# Patient Record
Sex: FEMALE | Race: BLACK OR AFRICAN AMERICAN | NOT HISPANIC OR LATINO | Employment: FULL TIME | URBAN - METROPOLITAN AREA
[De-identification: names, ages, dates, MRNs, and addresses within clinical notes are randomized per-mention and may not be internally consistent; named-entity substitution may affect disease eponyms.]

---

## 2023-06-06 ENCOUNTER — APPOINTMENT (OUTPATIENT)
Dept: PRIMARY CARE | Facility: CLINIC | Age: 28
End: 2023-06-06
Payer: MEDICAID

## 2023-10-03 ENCOUNTER — HOSPITAL ENCOUNTER (OUTPATIENT)
Facility: HOSPITAL | Age: 28
Setting detail: OBSERVATION
LOS: 1 days | Discharge: HOME | End: 2023-10-05
Attending: EMERGENCY MEDICINE | Admitting: INTERNAL MEDICINE
Payer: MEDICAID

## 2023-10-03 ENCOUNTER — APPOINTMENT (OUTPATIENT)
Dept: RADIOLOGY | Facility: HOSPITAL | Age: 28
End: 2023-10-03
Payer: MEDICAID

## 2023-10-03 DIAGNOSIS — N61.1 LEFT BREAST ABSCESS: ICD-10-CM

## 2023-10-03 DIAGNOSIS — L02.214 ABSCESS OF GROIN, RIGHT: Primary | ICD-10-CM

## 2023-10-03 LAB
ALBUMIN SERPL BCP-MCNC: 4.4 G/DL (ref 3.4–5)
ALP SERPL-CCNC: 63 U/L (ref 33–110)
ALT SERPL W P-5'-P-CCNC: 22 U/L (ref 7–45)
ANION GAP SERPL CALC-SCNC: 12 MMOL/L (ref 10–20)
AST SERPL W P-5'-P-CCNC: 12 U/L (ref 9–39)
B-HCG SERPL-ACNC: <2 MIU/ML
BASOPHILS # BLD AUTO: 0.01 X10*3/UL (ref 0–0.1)
BASOPHILS NFR BLD AUTO: 0.1 %
BILIRUB SERPL-MCNC: 1.2 MG/DL (ref 0–1.2)
BUN SERPL-MCNC: 9 MG/DL (ref 6–23)
CALCIUM SERPL-MCNC: 9.9 MG/DL (ref 8.6–10.3)
CHLORIDE SERPL-SCNC: 105 MMOL/L (ref 98–107)
CO2 SERPL-SCNC: 26 MMOL/L (ref 21–32)
CREAT SERPL-MCNC: 0.69 MG/DL (ref 0.5–1.05)
EOSINOPHIL # BLD AUTO: 0 X10*3/UL (ref 0–0.7)
EOSINOPHIL NFR BLD AUTO: 0 %
ERYTHROCYTE [DISTWIDTH] IN BLOOD BY AUTOMATED COUNT: 13.2 % (ref 11.5–14.5)
GFR SERPL CREATININE-BSD FRML MDRD: >90 ML/MIN/1.73M*2
GLUCOSE SERPL-MCNC: 101 MG/DL (ref 74–99)
HCT VFR BLD AUTO: 39.7 % (ref 36–46)
HGB BLD-MCNC: 13.2 G/DL (ref 12–16)
IMM GRANULOCYTES # BLD AUTO: 0.04 X10*3/UL (ref 0–0.7)
IMM GRANULOCYTES NFR BLD AUTO: 0.4 % (ref 0–0.9)
LYMPHOCYTES # BLD AUTO: 1.14 X10*3/UL (ref 1.2–4.8)
LYMPHOCYTES NFR BLD AUTO: 11.6 %
MCH RBC QN AUTO: 32.9 PG (ref 26–34)
MCHC RBC AUTO-ENTMCNC: 33.2 G/DL (ref 32–36)
MCV RBC AUTO: 99 FL (ref 80–100)
MONOCYTES # BLD AUTO: 0.61 X10*3/UL (ref 0.1–1)
MONOCYTES NFR BLD AUTO: 6.2 %
NEUTROPHILS # BLD AUTO: 8.02 X10*3/UL (ref 1.2–7.7)
NEUTROPHILS NFR BLD AUTO: 81.7 %
NRBC BLD-RTO: 0 /100 WBCS (ref 0–0)
PLATELET # BLD AUTO: 175 X10*3/UL (ref 150–450)
PMV BLD AUTO: 9.1 FL (ref 7.5–11.5)
POTASSIUM SERPL-SCNC: 4.1 MMOL/L (ref 3.5–5.3)
PROT SERPL-MCNC: 7.5 G/DL (ref 6.4–8.2)
RBC # BLD AUTO: 4.01 X10*6/UL (ref 4–5.2)
SODIUM SERPL-SCNC: 139 MMOL/L (ref 136–145)
WBC # BLD AUTO: 9.8 X10*3/UL (ref 4.4–11.3)

## 2023-10-03 PROCEDURE — 85025 COMPLETE CBC W/AUTO DIFF WBC: CPT | Performed by: PHYSICIAN ASSISTANT

## 2023-10-03 PROCEDURE — 74177 CT ABD & PELVIS W/CONTRAST: CPT | Performed by: RADIOLOGY

## 2023-10-03 PROCEDURE — 99222 1ST HOSP IP/OBS MODERATE 55: CPT | Performed by: INTERNAL MEDICINE

## 2023-10-03 PROCEDURE — 99203 OFFICE O/P NEW LOW 30 MIN: CPT

## 2023-10-03 PROCEDURE — 36415 COLL VENOUS BLD VENIPUNCTURE: CPT | Performed by: PHYSICIAN ASSISTANT

## 2023-10-03 PROCEDURE — 84075 ASSAY ALKALINE PHOSPHATASE: CPT | Performed by: PHYSICIAN ASSISTANT

## 2023-10-03 PROCEDURE — 1100000001 HC PRIVATE ROOM DAILY

## 2023-10-03 PROCEDURE — 74177 CT ABD & PELVIS W/CONTRAST: CPT

## 2023-10-03 PROCEDURE — 74176 CT ABD & PELVIS W/O CONTRAST: CPT | Performed by: RADIOLOGY

## 2023-10-03 PROCEDURE — 2500000004 HC RX 250 GENERAL PHARMACY W/ HCPCS (ALT 636 FOR OP/ED): Performed by: EMERGENCY MEDICINE

## 2023-10-03 PROCEDURE — 74176 CT ABD & PELVIS W/O CONTRAST: CPT | Mod: 59

## 2023-10-03 PROCEDURE — 2500000004 HC RX 250 GENERAL PHARMACY W/ HCPCS (ALT 636 FOR OP/ED): Performed by: PHYSICIAN ASSISTANT

## 2023-10-03 PROCEDURE — 84702 CHORIONIC GONADOTROPIN TEST: CPT | Performed by: PHYSICIAN ASSISTANT

## 2023-10-03 PROCEDURE — 99285 EMERGENCY DEPT VISIT HI MDM: CPT | Performed by: EMERGENCY MEDICINE

## 2023-10-03 PROCEDURE — 2500000004 HC RX 250 GENERAL PHARMACY W/ HCPCS (ALT 636 FOR OP/ED): Performed by: INTERNAL MEDICINE

## 2023-10-03 PROCEDURE — 2550000001 HC RX 255 CONTRASTS: Performed by: PHYSICIAN ASSISTANT

## 2023-10-03 RX ORDER — ENOXAPARIN SODIUM 100 MG/ML
40 INJECTION SUBCUTANEOUS DAILY
Status: DISCONTINUED | OUTPATIENT
Start: 2023-10-04 | End: 2023-10-05 | Stop reason: HOSPADM

## 2023-10-03 RX ORDER — VALACYCLOVIR HYDROCHLORIDE 500 MG/1
500 TABLET, FILM COATED ORAL 2 TIMES DAILY PRN
COMMUNITY

## 2023-10-03 RX ORDER — KETOROLAC TROMETHAMINE 30 MG/ML
15 INJECTION, SOLUTION INTRAMUSCULAR; INTRAVENOUS ONCE
Status: COMPLETED | OUTPATIENT
Start: 2023-10-03 | End: 2023-10-03

## 2023-10-03 RX ORDER — KETOROLAC TROMETHAMINE 30 MG/ML
30 INJECTION, SOLUTION INTRAMUSCULAR; INTRAVENOUS ONCE
Status: DISCONTINUED | OUTPATIENT
Start: 2023-10-03 | End: 2023-10-03

## 2023-10-03 RX ORDER — KETOROLAC TROMETHAMINE 15 MG/ML
15 INJECTION, SOLUTION INTRAMUSCULAR; INTRAVENOUS EVERY 6 HOURS PRN
Status: DISCONTINUED | OUTPATIENT
Start: 2023-10-03 | End: 2023-10-05 | Stop reason: HOSPADM

## 2023-10-03 RX ORDER — CLINDAMYCIN PHOSPHATE 600 MG/50ML
600 INJECTION, SOLUTION INTRAVENOUS ONCE
Status: DISCONTINUED | OUTPATIENT
Start: 2023-10-03 | End: 2023-10-03 | Stop reason: ALTCHOICE

## 2023-10-03 RX ORDER — KETOROLAC TROMETHAMINE 15 MG/ML
30 INJECTION, SOLUTION INTRAMUSCULAR; INTRAVENOUS EVERY 6 HOURS PRN
Status: DISCONTINUED | OUTPATIENT
Start: 2023-10-03 | End: 2023-10-05 | Stop reason: HOSPADM

## 2023-10-03 RX ADMIN — PIPERACILLIN SODIUM AND TAZOBACTAM SODIUM 3.38 G: 3; .375 INJECTION, SOLUTION INTRAVENOUS at 21:12

## 2023-10-03 RX ADMIN — CLINDAMYCIN PHOSPHATE 600 MG: 600 INJECTION, SOLUTION INTRAVENOUS at 20:28

## 2023-10-03 RX ADMIN — KETOROLAC TROMETHAMINE 15 MG: 30 INJECTION, SOLUTION INTRAMUSCULAR; INTRAVENOUS at 20:35

## 2023-10-03 RX ADMIN — IOHEXOL 75 ML: 350 INJECTION, SOLUTION INTRAVENOUS at 13:21

## 2023-10-03 SDOH — SOCIAL STABILITY: SOCIAL INSECURITY: DO YOU FEEL ANYONE HAS EXPLOITED OR TAKEN ADVANTAGE OF YOU FINANCIALLY OR OF YOUR PERSONAL PROPERTY?: NO

## 2023-10-03 SDOH — SOCIAL STABILITY: SOCIAL INSECURITY: HAVE YOU HAD THOUGHTS OF HARMING ANYONE ELSE?: NO

## 2023-10-03 SDOH — SOCIAL STABILITY: SOCIAL INSECURITY: HAS ANYONE EVER THREATENED TO HURT YOUR FAMILY OR YOUR PETS?: NO

## 2023-10-03 SDOH — SOCIAL STABILITY: SOCIAL INSECURITY: WERE YOU ABLE TO COMPLETE ALL THE BEHAVIORAL HEALTH SCREENINGS?: YES

## 2023-10-03 SDOH — SOCIAL STABILITY: SOCIAL INSECURITY: HAVE YOU HAD THOUGHTS OF HARMING ANYONE ELSE?: YES

## 2023-10-03 SDOH — SOCIAL STABILITY: SOCIAL INSECURITY: ARE THERE ANY APPARENT SIGNS OF INJURIES/BEHAVIORS THAT COULD BE RELATED TO ABUSE/NEGLECT?: NO

## 2023-10-03 SDOH — SOCIAL STABILITY: SOCIAL INSECURITY: DOES ANYONE TRY TO KEEP YOU FROM HAVING/CONTACTING OTHER FRIENDS OR DOING THINGS OUTSIDE YOUR HOME?: NO

## 2023-10-03 SDOH — SOCIAL STABILITY: SOCIAL INSECURITY: DO YOU FEEL UNSAFE GOING BACK TO THE PLACE WHERE YOU ARE LIVING?: NO

## 2023-10-03 SDOH — SOCIAL STABILITY: SOCIAL INSECURITY: ABUSE: ADULT

## 2023-10-03 SDOH — SOCIAL STABILITY: SOCIAL INSECURITY: ARE YOU OR HAVE YOU BEEN THREATENED OR ABUSED PHYSICALLY, EMOTIONALLY, OR SEXUALLY BY ANYONE?: NO

## 2023-10-03 ASSESSMENT — PAIN DESCRIPTION - ORIENTATION: ORIENTATION: RIGHT

## 2023-10-03 ASSESSMENT — PATIENT HEALTH QUESTIONNAIRE - PHQ9
2. FEELING DOWN, DEPRESSED OR HOPELESS: NOT AT ALL
SUM OF ALL RESPONSES TO PHQ9 QUESTIONS 1 & 2: 0
1. LITTLE INTEREST OR PLEASURE IN DOING THINGS: NOT AT ALL
SUM OF ALL RESPONSES TO PHQ9 QUESTIONS 1 & 2: 0
1. LITTLE INTEREST OR PLEASURE IN DOING THINGS: NOT AT ALL
2. FEELING DOWN, DEPRESSED OR HOPELESS: NOT AT ALL

## 2023-10-03 ASSESSMENT — COGNITIVE AND FUNCTIONAL STATUS - GENERAL
MOBILITY SCORE: 24
PATIENT BASELINE BEDBOUND: NO
PATIENT BASELINE BEDBOUND: NO
MOBILITY SCORE: 24
DAILY ACTIVITIY SCORE: 24
DAILY ACTIVITIY SCORE: 24

## 2023-10-03 ASSESSMENT — COLUMBIA-SUICIDE SEVERITY RATING SCALE - C-SSRS
6. HAVE YOU EVER DONE ANYTHING, STARTED TO DO ANYTHING, OR PREPARED TO DO ANYTHING TO END YOUR LIFE?: NO
6. HAVE YOU EVER DONE ANYTHING, STARTED TO DO ANYTHING, OR PREPARED TO DO ANYTHING TO END YOUR LIFE?: NO
1. IN THE PAST MONTH, HAVE YOU WISHED YOU WERE DEAD OR WISHED YOU COULD GO TO SLEEP AND NOT WAKE UP?: NO
2. HAVE YOU ACTUALLY HAD ANY THOUGHTS OF KILLING YOURSELF?: NO
1. IN THE PAST MONTH, HAVE YOU WISHED YOU WERE DEAD OR WISHED YOU COULD GO TO SLEEP AND NOT WAKE UP?: NO
2. HAVE YOU ACTUALLY HAD ANY THOUGHTS OF KILLING YOURSELF?: NO

## 2023-10-03 ASSESSMENT — ACTIVITIES OF DAILY LIVING (ADL)
GROOMING: INDEPENDENT
BATHING: INDEPENDENT
DRESSING YOURSELF: INDEPENDENT
TOILETING: INDEPENDENT
FEEDING YOURSELF: INDEPENDENT
WALKS IN HOME: INDEPENDENT
FEEDING YOURSELF: INDEPENDENT
HEARING - RIGHT EAR: FUNCTIONAL
TOILETING: INDEPENDENT
ADEQUATE_TO_COMPLETE_ADL: YES
PATIENT'S MEMORY ADEQUATE TO SAFELY COMPLETE DAILY ACTIVITIES?: YES
HEARING - LEFT EAR: FUNCTIONAL
BATHING: INDEPENDENT
GROOMING: INDEPENDENT
JUDGMENT_ADEQUATE_SAFELY_COMPLETE_DAILY_ACTIVITIES: YES
JUDGMENT_ADEQUATE_SAFELY_COMPLETE_DAILY_ACTIVITIES: YES
ADEQUATE_TO_COMPLETE_ADL: YES
WALKS IN HOME: INDEPENDENT
PATIENT'S MEMORY ADEQUATE TO SAFELY COMPLETE DAILY ACTIVITIES?: YES
DRESSING YOURSELF: INDEPENDENT

## 2023-10-03 ASSESSMENT — PAIN SCALES - GENERAL
PAINLEVEL_OUTOF10: 10 - WORST POSSIBLE PAIN
PAINLEVEL_OUTOF10: 10 - WORST POSSIBLE PAIN

## 2023-10-03 ASSESSMENT — ENCOUNTER SYMPTOMS
RESPIRATORY NEGATIVE: 1
WOUND: 1
EYES NEGATIVE: 1
CONSTITUTIONAL NEGATIVE: 1
GASTROINTESTINAL NEGATIVE: 1
ALLERGIC/IMMUNOLOGIC NEGATIVE: 1
CARDIOVASCULAR NEGATIVE: 1
PSYCHIATRIC NEGATIVE: 1
HEMATOLOGIC/LYMPHATIC NEGATIVE: 1
COLOR CHANGE: 1
ENDOCRINE NEGATIVE: 1
MUSCULOSKELETAL NEGATIVE: 1
NEUROLOGICAL NEGATIVE: 1

## 2023-10-03 ASSESSMENT — LIFESTYLE VARIABLES
HOW OFTEN DO YOU HAVE 6 OR MORE DRINKS ON ONE OCCASION: NEVER
AUDIT-C TOTAL SCORE: 1
HOW OFTEN DO YOU HAVE A DRINK CONTAINING ALCOHOL: MONTHLY OR LESS
HOW OFTEN DO YOU HAVE A DRINK CONTAINING ALCOHOL: MONTHLY OR LESS
SKIP TO QUESTIONS 9-10: 0
PRESCIPTION_ABUSE_PAST_12_MONTHS: NO
AUDIT-C TOTAL SCORE: 1
AUDIT-C TOTAL SCORE: 2
HOW OFTEN DO YOU HAVE 6 OR MORE DRINKS ON ONE OCCASION: LESS THAN MONTHLY
SUBSTANCE_ABUSE_PAST_12_MONTHS: YES
AUDIT-C TOTAL SCORE: 2
PRESCIPTION_ABUSE_PAST_12_MONTHS: NO
SKIP TO QUESTIONS 9-10: 1
HOW MANY STANDARD DRINKS CONTAINING ALCOHOL DO YOU HAVE ON A TYPICAL DAY: 1 OR 2
HOW MANY STANDARD DRINKS CONTAINING ALCOHOL DO YOU HAVE ON A TYPICAL DAY: 1 OR 2
SUBSTANCE_ABUSE_PAST_12_MONTHS: YES

## 2023-10-03 ASSESSMENT — PAIN - FUNCTIONAL ASSESSMENT: PAIN_FUNCTIONAL_ASSESSMENT: 0-10

## 2023-10-03 ASSESSMENT — PAIN DESCRIPTION - LOCATION: LOCATION: OTHER (COMMENT)

## 2023-10-03 NOTE — PROGRESS NOTES
Pharmacy Medication History Review    Leonardo Hardy is a 27 y.o. female admitted for Abscess of groin, right. Pharmacy reviewed the patient's [id number] medications and allergies for accuracy.    The list below reflectives the updated PTA list. Please review each medication in order reconciliation for additional clarification and justification.  (Not in a hospital admission)       The list below reflectives the updated allergy list. Please review each documented allergy for additional clarification and justification.  Allergies  Reviewed by Randi Reyes PA-C on 10/3/2023        Severity Reactions Comments    Erythromycin High Shortness of breath, Hives, Rash ? rash as infant Throat swells    Fish Derived Medium Hives     Sulfa (sulfonamide Antibiotics) Not Specified Hives             Below are additional concerns with the patient's PTA list.  Prior to Admission Medications   Prescriptions Last Dose Informant Patient Reported? Taking?   valACYclovir (Valtrex) 500 mg tablet   Yes No   Sig: Take 1 tablet (500 mg) by mouth 2 times a day as needed (for flares). Take for 5 days when flares show up      Facility-Administered Medications: None        Per patient . No Rx meds or otc other than the PRN listed     Siria Johnston CPhT

## 2023-10-03 NOTE — CONSULTS
SUSHMA VILLEGAS is a 27 year old Female with past medical history of carcinoid tumor s/p partial colectomy, hidradenitis suppurativa on chronic suppression with doxycycline now presenting to the ED with severe right groin pain and left breast discomfort.  In May patient had left breast abscess drained with penrose tube placed by Dr. Castillo, was admitted for IV antibiotics. Left breast without any recent flares until few weeks ago but has been manageable, discomfort minimal. Right groin has been getting progressively worse over the last week. 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 Having difficulty walking due to pain and swelling. Both areas are inflamed, red and indurated. Denies fever/chills.        Surgery consulted for left breast and right groin evaluation. Breast is warm, firm, indurated, inflammation mainly at 7 oclock, areola intact. Right groin is markedly inflamed with active drainage, multiple areas of discoloration and induration.     CT scan showed: In the area of clinical concern which was the proximal medial right thigh, there was an area medial skin thickening and edema of the subcutaneous fat with a small abscess in the medial subcutaneous fat measuring 23 x 20 mm, on axial image 178/200. There is mild thickening the fascia overlying the musculature of the medial aspect of the proximal to mid right thigh. Just cephalad and posterior to this abscess is a 2nd smaller abscess that measures 31 x 12  mm, on axial image 168/200. This particular abscess is at the posterior base of the right labia majora.    Lab work overall unremarkable.VSS, afebrile.     PMH: as above    PSH: appendectomy     Social: intermittent ETOH/MJ use, denies tobacco     Family: no relevant family history identified at this time     Vital signs for last 24 hours:  Temp:  [37 °C  (98.6 °F)] 37 °C (98.6 °F)  Heart Rate:  [103] 103  Resp:  [20] 20  BP: (145)/(87) 145/87      Physical Exam  Physical Exam  Constitutional:       Appearance: Normal appearance.   Cardiovascular:      Rate and Rhythm: Tachycardia present.   Pulmonary:      Effort: Pulmonary effort is normal. No respiratory distress.   Abdominal:      Palpations: Abdomen is soft.   Musculoskeletal:      Cervical back: Neck supple.      Comments: Difficulty with ambulation due to groin pain      Skin:     Comments:  Breast is warm, firm, indurated, inflammation mainly at 7 oclock, areola intact. Right groin is markedly inflamed with active drainage, multiple areas of discoloration and induration.    Neurological:      General: No focal deficit present.      Mental Status: She is alert and oriented to person, place, and time.   Psychiatric:         Behavior: Behavior normal.     Scheduled medications  clindamycin, 600 mg, intravenous, Once  ketorolac, 30 mg, intramuscular, Once      Results for orders placed or performed during the hospital encounter of 10/03/23 (from the past 24 hour(s))   CBC and Auto Differential   Result Value Ref Range    WBC 9.8 4.4 - 11.3 x10*3/uL    nRBC 0.0 0.0 - 0.0 /100 WBCs    RBC 4.01 4.00 - 5.20 x10*6/uL    Hemoglobin 13.2 12.0 - 16.0 g/dL    Hematocrit 39.7 36.0 - 46.0 %    MCV 99 80 - 100 fL    MCH 32.9 26.0 - 34.0 pg    MCHC 33.2 32.0 - 36.0 g/dL    RDW 13.2 11.5 - 14.5 %    Platelets 175 150 - 450 x10*3/uL    MPV 9.1 7.5 - 11.5 fL    Neutrophils % 81.7 40.0 - 80.0 %    Immature Granulocytes %, Automated 0.4 0.0 - 0.9 %    Lymphocytes % 11.6 13.0 - 44.0 %    Monocytes % 6.2 2.0 - 10.0 %    Eosinophils % 0.0 0.0 - 6.0 %    Basophils % 0.1 0.0 - 2.0 %    Neutrophils Absolute 8.02 (H) 1.20 - 7.70 x10*3/uL    Immature Granulocytes Absolute, Automated 0.04 0.00 - 0.70 x10*3/uL    Lymphocytes Absolute 1.14 (L) 1.20 - 4.80 x10*3/uL    Monocytes Absolute 0.61 0.10 - 1.00 x10*3/uL    Eosinophils Absolute 0.00  0.00 - 0.70 x10*3/uL    Basophils Absolute 0.01 0.00 - 0.10 x10*3/uL   Comprehensive metabolic panel   Result Value Ref Range    Glucose 101 (H) 74 - 99 mg/dL    Sodium 139 136 - 145 mmol/L    Potassium 4.1 3.5 - 5.3 mmol/L    Chloride 105 98 - 107 mmol/L    Bicarbonate 26 21 - 32 mmol/L    Anion Gap 12 10 - 20 mmol/L    Urea Nitrogen 9 6 - 23 mg/dL    Creatinine 0.69 0.50 - 1.05 mg/dL    eGFR >90 >60 mL/min/1.73m*2    Calcium 9.9 8.6 - 10.3 mg/dL    Albumin 4.4 3.4 - 5.0 g/dL    Alkaline Phosphatase 63 33 - 110 U/L    Total Protein 7.5 6.4 - 8.2 g/dL    AST 12 9 - 39 U/L    Bilirubin, Total 1.2 0.0 - 1.2 mg/dL    ALT 22 7 - 45 U/L   Human Chorionic Gonadotropin, Serum Quantitative   Result Value Ref Range    HCG, Beta-Quantitative <2 <5 mIU/mL      CT abdomen pelvis wo IV contrast    Result Date: 10/3/2023  Interpreted By:  Philipp Yanez, STUDY: CT ABDOMEN PELVIS WO IV CONTRAST;  10/3/2023 2:35 pm   INDICATION: Signs/Symptoms:abscess, first scan not low enough. Only need pelvis through midthigh.   COMPARISON: The exam was done immediately after a CT scan of the abdomen and pelvis with contrast earlier today at 1:30 p.m.. The images from that exam apparently did not include the area of interest which was the medial upper right..   ACCESSION NUMBER(S): [id number]   ORDERING CLINICIAN: DEAN PANDEY   TECHNIQUE: Repeat axial images were obtained from the xiphoid down through the mid thighs.   FINDINGS: In the area of clinical concern which was the proximal medial right thigh, there was an area medial skin thickening and edema of the subcutaneous fat with a small abscess in the medial subcutaneous fat measuring 23 x 20 mm, on axial image 178/200. There is mild thickening the fascia overlying the musculature of the medial aspect of the proximal to mid right thigh. Just cephalad and posterior to this abscess is a 2nd smaller abscess that measures 31 x 12  mm, on axial image 168/200. This particular abscess is at the  posterior base of the right labia majora.   For additional information, please see the report just dictated for the 1:30 p.m. exam.       There are 2 abscesses in the subcutaneous fat of the proximal to mid medial right thigh as described. Please see above for details.   MACRO: None   Signed by: Philipp Yanez 10/3/2023 2:49 PM Dictation workstation:   [id number]    CT abdomen pelvis w IV contrast    Result Date: 10/3/2023  Interpreted By:  Philipp Yanez, STUDY: CT ABDOMEN PELVIS W IV CONTRAST;  10/3/2023 1:33 pm   INDICATION: 28 y/o   F with  Signs/Symptoms:groin abscess.   LIMITATIONS: None.   ACCESSION NUMBER(S): [id number]   ORDERING CLINICIAN: DEAN PANDEY   TECHNIQUE: After the administration of   oral water and IV nonionic contrast, spiral axial images were obtained from the xiphoid down through the symphysis pubis. Sagittal and coronal reconstruction images were generated. Bone, mediastinal, lung, and liver windows were reviewed. IV contrast agent was Omnipaque 350, 75 mL.   COMPARISON: Previous exam is from 10/17/2022..   FINDINGS: LUNG BASES: No mass or pneumonia or pleural effusion in either lung base..   LIVER: Hepatomegaly, with the liver measuring 19.8 cm on the right Diffusely decreased liver density.. No  liver lesion evident in this  exam.   GALLBLADDER: No calcified stone, gallbladder wall thickening, or adjacent edema.   BILE DUCTS: No intrahepatic biliary ductal dilatation.  Common bile duct was within the limits of normal.   SPLEEN: No  splenomegaly. No splenic mass..   PANCREAS: No pancreatic mass or inflammation, or ductal dilatation.   KIDNEYS/ADRENALS: No adrenal mass or enlargement. No calcified stone, hydronephrosis, mass, or perinephric edema in either kidney. No ureteral stone or dilatation.   BLADDER/PELVIS: Urinary bladder was grossly intact. No uterine enlargement or gross adnexal mass. There is a tubal ligation clip in the posteroinferior right pelvis OA from the right adnexa, and  there is a displaced tubal ligation clip along the anterior surface of the mid abdominal peritoneum on axial image 106, just below the umbilicus.   GREAT VESSELS/RETROPERITONEUM: Abdominal aorta and IVC were intact. No suspicious retroperitoneal adenopathy. No suspicious mesenteric adenopathy. There is mild bilateral inguinal adenopathy, right greater than left. The largest lymph node on the right measures 15 mm AP today, compared to 11 mm previously. There is a right common femoral chain lymph node measuring 21 x 8 mm, previously measuring 15 x 7 mm. No other pelvic adenopathy..   PERITONEUM: No ascites. No pneumoperitoneum. No peritoneal or mesenteric mass or inflammation.   BOWEL: The stomach was  grossly intact. There was no small bowel dilatation or small bowel wall thickening. No small-bowel obstruction. Previous partial right colectomy with reanastomosis.   BONES: No destructive lytic or blastic bone lesion.   ABDOMINAL WALL: Unremarkable. There is no abscess in either imaged inguinal region. There was no skin thickening or stranding of the subcutaneous fat.       Previous partial right colectomy with reanastomosis.   Stable displaced tubal ligation clip from the right, remaining present in the right anterior midline pelvic mesenteric fat. Interval displacement of previous left tubal ligation clip.   Mild nonspecific bilateral inguinal adenopathy, left greater than right, mildly progressed on the right. This could be infectious/inflammatory adenopathy. There is also enlargement of a right common femoral chain lymph node. There is however no associated fluid collection or edema in the imaged portion of the right groin.   Mild hepatomegaly.   MACRO: None   Signed by: Philipp Yanez 10/3/2023 2:19 PM Dictation workstation:   [id number]        Plan:  - plan to admit, deciding medicine vs surgery  - continue antibiotics   - possible NPO at midnight for add on to OR      Dispo: Imaging to be reviewed by attending,  further plan of care pending.      Franny Torres, CNP

## 2023-10-03 NOTE — ED PROVIDER NOTES
HPI   Chief Complaint   Patient presents with    Abscess     Right thigh. Left breast.       27-year-old female with a history of hidradenitis suppurativa on daily doxycycline since June presents with concern for new groin abscess and left breast abscess.  Patient states that she was seen in June or July for surgery for the breast abscess and has been on doxycycline since.  Over the last week and a half, she has noticed a groin abscess but is not improving.  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  This seemed to make the area worse and is now more inflamed and expanding.  Dermatologist also placed an opening in the abscess with hopes that it would drain, but she did not change the antibiotics that the patient has been on.  Patient reports no fever or chills throughout this time but just extreme pain.  She is taken some Motrin with little relief.  No other complaints.  Denies urinary symptoms.                          No data recorded                Patient History   History reviewed. No pertinent past medical history.  Past Surgical History:   Procedure Laterality Date    CT NECK ANGIO W AND WO IV CONTRAST  12/26/2020    CT NECK ANGIO W AND WO IV CONTRAST 12/26/2020     No family history on file.  Social History     Tobacco Use    Smoking status: Not on file    Smokeless tobacco: Not on file   Substance Use Topics    Alcohol use: Not on file    Drug use: Not on file       Physical Exam   ED Triage Vitals [10/03/23 0725]   Temp Heart Rate Resp BP   37 °C (98.6 °F) 103 20 145/87      SpO2 Temp src Heart Rate Source Patient Position   100 % -- Monitor Sitting      BP Location FiO2 (%)     Left arm --       Physical Exam  Vitals and nursing note reviewed.   Constitutional:       General: She is not in acute distress.     Appearance: Normal appearance. She is not ill-appearing.   HENT:      Head: Normocephalic and atraumatic.      Right Ear: External ear normal.      Left  Ear: External ear normal.      Nose: Nose normal.      Mouth/Throat:      Mouth: Mucous membranes are moist.   Eyes:      Extraocular Movements: Extraocular movements intact.      Conjunctiva/sclera: Conjunctivae normal.      Pupils: Pupils are equal, round, and reactive to light.   Cardiovascular:      Rate and Rhythm: Normal rate and regular rhythm.      Pulses: Normal pulses.   Pulmonary:      Effort: Pulmonary effort is normal. No respiratory distress.      Breath sounds: Normal breath sounds.   Abdominal:      General: Abdomen is flat.      Palpations: Abdomen is soft.      Tenderness: There is no abdominal tenderness.   Genitourinary:     Comments: Significant abscess along the right most proximal thigh near where start of perineum is extending through the gluteus region.  No surrounding erythema other than local to the painful areas.  There is a small opening in the thigh portion that is not actively draining.  Exquisitely tender to palpation.  No crepitus or deformity palpated.  2+ DP pulse.  Musculoskeletal:         General: Normal range of motion.      Cervical back: Normal range of motion and neck supple.   Skin:     General: Skin is warm and dry.      Capillary Refill: Capillary refill takes less than 2 seconds.      Comments: Left breast: Medial aspect of the breast at approximately 7 to 8 PM there is a 2 cm x 2 cm area of fluctuance without warmth or significant pain to palpation.  Appears and feels superficial.  Old scar is present, this is much smaller.   Neurological:      General: No focal deficit present.      Mental Status: She is alert and oriented to person, place, and time.   Psychiatric:         Mood and Affect: Mood normal.       ED Course & MDM   ED Course as of 10/03/23 1901   Tue Oct 03, 2023   1900 Comprehensive metabolic panel(!)  noted [EP]   1900 CBC and Auto Differential(!)  noted [EP]   1900 CT abdomen pelvis wo IV contrast  reviewed [EP]      ED Course User Index  [EP] Randi NOYOLA  SHAMA Reyes         Diagnoses as of 10/03/23 1901   Abscess of groin, right   Left breast abscess       Medical Decision Making  27-year-old female with a history of at HS presents with concern for 2 abscesses.  The patient is nontoxic-appearing but her vital signs are notable for hypertension and tachycardia of 145/87 and 103.  The patient is afebrile.  Based on her presentation, differential diagnosis includes infected hidradenitis, NSTI, or local cellulitis.  Will obtain CBC with differential, CMP, hCG, and CT abdomen and pelvis with IV contrast.  Will defer I&D of breast abscess at this time pending results of CT. the patient was given 30 mg IM Toradol for pain control.  hCG was negative.  CMP grossly unremarkable.  CBC unremarkable other than increased absolute neutrophils.  CT showed 2 abscesses in the subcutaneous fat of the proximal to mid right thigh.  A second CT had to be completed prior to those results as it did not go low enough for viewing of the abscesses.  No further work-up indicated at this time.  Surgical team was consulted and recommended medical management versus surgical.  The patient was given 600 mg IV clindamycin for antibiotic coverage.  She was accepted for admission to Dr. Orellana.            Procedure  Procedures     Randi Reyes PA-C  10/03/23 1901

## 2023-10-04 ENCOUNTER — ANESTHESIA (OUTPATIENT)
Dept: OPERATING ROOM | Facility: HOSPITAL | Age: 28
End: 2023-10-04
Payer: MEDICAID

## 2023-10-04 ENCOUNTER — ANESTHESIA EVENT (OUTPATIENT)
Dept: OPERATING ROOM | Facility: HOSPITAL | Age: 28
End: 2023-10-04
Payer: MEDICAID

## 2023-10-04 PROBLEM — L73.2 HIDRADENITIS SUPPURATIVA: Status: ACTIVE | Noted: 2023-10-04

## 2023-10-04 PROBLEM — N61.1 LEFT BREAST ABSCESS: Status: ACTIVE | Noted: 2023-10-03

## 2023-10-04 LAB
ANION GAP SERPL CALC-SCNC: 10 MMOL/L (ref 10–20)
BUN SERPL-MCNC: 10 MG/DL (ref 6–23)
CALCIUM SERPL-MCNC: 9 MG/DL (ref 8.6–10.3)
CHLORIDE SERPL-SCNC: 104 MMOL/L (ref 98–107)
CO2 SERPL-SCNC: 27 MMOL/L (ref 21–32)
CREAT SERPL-MCNC: 0.81 MG/DL (ref 0.5–1.05)
ERYTHROCYTE [DISTWIDTH] IN BLOOD BY AUTOMATED COUNT: 13.2 % (ref 11.5–14.5)
GFR SERPL CREATININE-BSD FRML MDRD: >90 ML/MIN/1.73M*2
GLUCOSE SERPL-MCNC: 99 MG/DL (ref 74–99)
HCT VFR BLD AUTO: 35.9 % (ref 36–46)
HGB BLD-MCNC: 11.6 G/DL (ref 12–16)
MCH RBC QN AUTO: 32.3 PG (ref 26–34)
MCHC RBC AUTO-ENTMCNC: 32.3 G/DL (ref 32–36)
MCV RBC AUTO: 100 FL (ref 80–100)
NRBC BLD-RTO: 0 /100 WBCS (ref 0–0)
PLATELET # BLD AUTO: 155 X10*3/UL (ref 150–450)
PMV BLD AUTO: 9.2 FL (ref 7.5–11.5)
POTASSIUM SERPL-SCNC: 4.3 MMOL/L (ref 3.5–5.3)
RBC # BLD AUTO: 3.59 X10*6/UL (ref 4–5.2)
SODIUM SERPL-SCNC: 137 MMOL/L (ref 136–145)
VANCOMYCIN TROUGH SERPL-MCNC: 22.9 UG/ML (ref 5–20)
WBC # BLD AUTO: 7.5 X10*3/UL (ref 4.4–11.3)

## 2023-10-04 PROCEDURE — 2500000001 HC RX 250 WO HCPCS SELF ADMINISTERED DRUGS (ALT 637 FOR MEDICARE OP): Performed by: INTERNAL MEDICINE

## 2023-10-04 PROCEDURE — 2500000004 HC RX 250 GENERAL PHARMACY W/ HCPCS (ALT 636 FOR OP/ED): Performed by: SURGERY

## 2023-10-04 PROCEDURE — 36415 COLL VENOUS BLD VENIPUNCTURE: CPT

## 2023-10-04 PROCEDURE — 10061 I&D ABSCESS COMP/MULTIPLE: CPT | Performed by: SURGERY

## 2023-10-04 PROCEDURE — G0378 HOSPITAL OBSERVATION PER HR: HCPCS

## 2023-10-04 PROCEDURE — 2500000004 HC RX 250 GENERAL PHARMACY W/ HCPCS (ALT 636 FOR OP/ED): Performed by: INTERNAL MEDICINE

## 2023-10-04 PROCEDURE — 2500000004 HC RX 250 GENERAL PHARMACY W/ HCPCS (ALT 636 FOR OP/ED): Performed by: ANESTHESIOLOGIST ASSISTANT

## 2023-10-04 PROCEDURE — 87070 CULTURE OTHR SPECIMN AEROBIC: CPT | Mod: AHULAB,CMCLAB | Performed by: NURSE PRACTITIONER

## 2023-10-04 PROCEDURE — 2580000001 HC RX 258 IV SOLUTIONS: Performed by: ANESTHESIOLOGIST ASSISTANT

## 2023-10-04 PROCEDURE — 2580000001 HC RX 258 IV SOLUTIONS: Performed by: INTERNAL MEDICINE

## 2023-10-04 PROCEDURE — A10060 PR DRAIN SKIN ABSCESS SIMPLE: Performed by: ANESTHESIOLOGY

## 2023-10-04 PROCEDURE — 99233 SBSQ HOSP IP/OBS HIGH 50: CPT | Performed by: INTERNAL MEDICINE

## 2023-10-04 PROCEDURE — 3600000007 HC OR TIME - EACH INCREMENTAL 1 MINUTE - PROCEDURE LEVEL TWO: Performed by: SURGERY

## 2023-10-04 PROCEDURE — 3700000002 HC GENERAL ANESTHESIA TIME - EACH INCREMENTAL 1 MINUTE: Performed by: SURGERY

## 2023-10-04 PROCEDURE — A4217 STERILE WATER/SALINE, 500 ML: HCPCS | Performed by: SURGERY

## 2023-10-04 PROCEDURE — 2500000001 HC RX 250 WO HCPCS SELF ADMINISTERED DRUGS (ALT 637 FOR MEDICARE OP): Performed by: ANESTHESIOLOGY

## 2023-10-04 PROCEDURE — 80048 BASIC METABOLIC PNL TOTAL CA: CPT

## 2023-10-04 PROCEDURE — 3600000002 HC OR TIME - INITIAL BASE CHARGE - PROCEDURE LEVEL TWO: Performed by: SURGERY

## 2023-10-04 PROCEDURE — 2500000004 HC RX 250 GENERAL PHARMACY W/ HCPCS (ALT 636 FOR OP/ED): Performed by: ANESTHESIOLOGY

## 2023-10-04 PROCEDURE — 1100000001 HC PRIVATE ROOM DAILY

## 2023-10-04 PROCEDURE — 7100000002 HC RECOVERY ROOM TIME - EACH INCREMENTAL 1 MINUTE: Performed by: SURGERY

## 2023-10-04 PROCEDURE — 85027 COMPLETE CBC AUTOMATED: CPT

## 2023-10-04 PROCEDURE — 2720000007 HC OR 272 NO HCPCS: Performed by: SURGERY

## 2023-10-04 PROCEDURE — 99232 SBSQ HOSP IP/OBS MODERATE 35: CPT

## 2023-10-04 PROCEDURE — 99253 IP/OBS CNSLTJ NEW/EST LOW 45: CPT | Performed by: SURGERY

## 2023-10-04 PROCEDURE — A10060 PR DRAIN SKIN ABSCESS SIMPLE: Performed by: ANESTHESIOLOGIST ASSISTANT

## 2023-10-04 PROCEDURE — 7100000001 HC RECOVERY ROOM TIME - INITIAL BASE CHARGE: Performed by: SURGERY

## 2023-10-04 PROCEDURE — 2500000005 HC RX 250 GENERAL PHARMACY W/O HCPCS: Performed by: SURGERY

## 2023-10-04 PROCEDURE — 2500000005 HC RX 250 GENERAL PHARMACY W/O HCPCS: Performed by: ANESTHESIOLOGIST ASSISTANT

## 2023-10-04 PROCEDURE — 80202 ASSAY OF VANCOMYCIN: CPT | Performed by: INTERNAL MEDICINE

## 2023-10-04 PROCEDURE — 3700000001 HC GENERAL ANESTHESIA TIME - INITIAL BASE CHARGE: Performed by: SURGERY

## 2023-10-04 RX ORDER — OXYCODONE HYDROCHLORIDE 5 MG/1
5 TABLET ORAL EVERY 4 HOURS PRN
Status: DISCONTINUED | OUTPATIENT
Start: 2023-10-04 | End: 2023-10-05 | Stop reason: HOSPADM

## 2023-10-04 RX ORDER — FENTANYL CITRATE 50 UG/ML
INJECTION, SOLUTION INTRAMUSCULAR; INTRAVENOUS AS NEEDED
Status: DISCONTINUED | OUTPATIENT
Start: 2023-10-04 | End: 2023-10-04

## 2023-10-04 RX ORDER — SODIUM CHLORIDE 0.9 G/100ML
IRRIGANT IRRIGATION AS NEEDED
Status: DISCONTINUED | OUTPATIENT
Start: 2023-10-04 | End: 2023-10-04 | Stop reason: HOSPADM

## 2023-10-04 RX ORDER — VANCOMYCIN HYDROCHLORIDE 1 G/20ML
INJECTION, POWDER, LYOPHILIZED, FOR SOLUTION INTRAVENOUS DAILY PRN
Status: DISCONTINUED | OUTPATIENT
Start: 2023-10-04 | End: 2023-10-04

## 2023-10-04 RX ORDER — BUPIVACAINE HYDROCHLORIDE 5 MG/ML
INJECTION, SOLUTION PERINEURAL AS NEEDED
Status: DISCONTINUED | OUTPATIENT
Start: 2023-10-04 | End: 2023-10-04 | Stop reason: HOSPADM

## 2023-10-04 RX ORDER — PANTOPRAZOLE SODIUM 40 MG/1
40 TABLET, DELAYED RELEASE ORAL
Status: DISCONTINUED | OUTPATIENT
Start: 2023-10-05 | End: 2023-10-05 | Stop reason: HOSPADM

## 2023-10-04 RX ORDER — LIDOCAINE HYDROCHLORIDE 20 MG/ML
INJECTION, SOLUTION INFILTRATION; PERINEURAL AS NEEDED
Status: DISCONTINUED | OUTPATIENT
Start: 2023-10-04 | End: 2023-10-04

## 2023-10-04 RX ORDER — PANTOPRAZOLE SODIUM 40 MG/10ML
40 INJECTION, POWDER, LYOPHILIZED, FOR SOLUTION INTRAVENOUS
Status: DISCONTINUED | OUTPATIENT
Start: 2023-10-05 | End: 2023-10-05 | Stop reason: HOSPADM

## 2023-10-04 RX ORDER — ACETAMINOPHEN 325 MG/1
650 TABLET ORAL EVERY 4 HOURS PRN
Status: DISCONTINUED | OUTPATIENT
Start: 2023-10-04 | End: 2023-10-05 | Stop reason: HOSPADM

## 2023-10-04 RX ORDER — MIDAZOLAM HYDROCHLORIDE 1 MG/ML
INJECTION, SOLUTION INTRAMUSCULAR; INTRAVENOUS AS NEEDED
Status: DISCONTINUED | OUTPATIENT
Start: 2023-10-04 | End: 2023-10-04

## 2023-10-04 RX ORDER — ONDANSETRON 4 MG/1
4 TABLET, ORALLY DISINTEGRATING ORAL EVERY 8 HOURS PRN
Status: DISCONTINUED | OUTPATIENT
Start: 2023-10-04 | End: 2023-10-05 | Stop reason: HOSPADM

## 2023-10-04 RX ORDER — ACETAMINOPHEN 650 MG/1
650 SUPPOSITORY RECTAL EVERY 4 HOURS PRN
Status: DISCONTINUED | OUTPATIENT
Start: 2023-10-04 | End: 2023-10-05 | Stop reason: HOSPADM

## 2023-10-04 RX ORDER — ONDANSETRON HYDROCHLORIDE 2 MG/ML
4 INJECTION, SOLUTION INTRAVENOUS EVERY 8 HOURS PRN
Status: DISCONTINUED | OUTPATIENT
Start: 2023-10-04 | End: 2023-10-05 | Stop reason: HOSPADM

## 2023-10-04 RX ORDER — SODIUM CHLORIDE, SODIUM LACTATE, POTASSIUM CHLORIDE, CALCIUM CHLORIDE 600; 310; 30; 20 MG/100ML; MG/100ML; MG/100ML; MG/100ML
100 INJECTION, SOLUTION INTRAVENOUS CONTINUOUS
Status: DISCONTINUED | OUTPATIENT
Start: 2023-10-04 | End: 2023-10-05 | Stop reason: HOSPADM

## 2023-10-04 RX ORDER — LIDOCAINE HYDROCHLORIDE 10 MG/ML
0.1 INJECTION, SOLUTION EPIDURAL; INFILTRATION; INTRACAUDAL; PERINEURAL ONCE
Status: DISCONTINUED | OUTPATIENT
Start: 2023-10-04 | End: 2023-10-04

## 2023-10-04 RX ORDER — ONDANSETRON HYDROCHLORIDE 2 MG/ML
4 INJECTION, SOLUTION INTRAVENOUS ONCE AS NEEDED
Status: DISCONTINUED | OUTPATIENT
Start: 2023-10-04 | End: 2023-10-05 | Stop reason: HOSPADM

## 2023-10-04 RX ORDER — OXYCODONE HYDROCHLORIDE 5 MG/1
10 TABLET ORAL EVERY 6 HOURS PRN
Status: DISCONTINUED | OUTPATIENT
Start: 2023-10-04 | End: 2023-10-05 | Stop reason: HOSPADM

## 2023-10-04 RX ORDER — PHENYLEPHRINE 10 MG/250 ML(40 MCG/ML)IN 0.9 % SOD.CHLORIDE INTRAVENOUS
CONTINUOUS PRN
Status: DISCONTINUED | OUTPATIENT
Start: 2023-10-04 | End: 2023-10-04

## 2023-10-04 RX ORDER — DIPHENHYDRAMINE HYDROCHLORIDE 50 MG/ML
12.5 INJECTION INTRAMUSCULAR; INTRAVENOUS ONCE AS NEEDED
Status: DISCONTINUED | OUTPATIENT
Start: 2023-10-04 | End: 2023-10-05 | Stop reason: HOSPADM

## 2023-10-04 RX ORDER — VALACYCLOVIR HYDROCHLORIDE 500 MG/1
500 TABLET, FILM COATED ORAL 2 TIMES DAILY PRN
Status: CANCELLED | OUTPATIENT
Start: 2023-10-04

## 2023-10-04 RX ORDER — PROPOFOL 10 MG/ML
INJECTION, EMULSION INTRAVENOUS AS NEEDED
Status: DISCONTINUED | OUTPATIENT
Start: 2023-10-04 | End: 2023-10-04

## 2023-10-04 RX ORDER — LABETALOL HYDROCHLORIDE 5 MG/ML
5 INJECTION, SOLUTION INTRAVENOUS ONCE AS NEEDED
Status: DISCONTINUED | OUTPATIENT
Start: 2023-10-04 | End: 2023-10-04

## 2023-10-04 RX ORDER — ACETAMINOPHEN 160 MG/5ML
650 SOLUTION ORAL EVERY 4 HOURS PRN
Status: DISCONTINUED | OUTPATIENT
Start: 2023-10-04 | End: 2023-10-05 | Stop reason: HOSPADM

## 2023-10-04 RX ORDER — VANCOMYCIN HYDROCHLORIDE 1 G/200ML
1000 INJECTION, SOLUTION INTRAVENOUS EVERY 12 HOURS
Status: DISCONTINUED | OUTPATIENT
Start: 2023-10-04 | End: 2023-10-05 | Stop reason: HOSPADM

## 2023-10-04 RX ORDER — TALC
3 POWDER (GRAM) TOPICAL DAILY
Status: DISCONTINUED | OUTPATIENT
Start: 2023-10-04 | End: 2023-10-05 | Stop reason: HOSPADM

## 2023-10-04 RX ORDER — SODIUM CHLORIDE, SODIUM LACTATE, POTASSIUM CHLORIDE, CALCIUM CHLORIDE 600; 310; 30; 20 MG/100ML; MG/100ML; MG/100ML; MG/100ML
100 INJECTION, SOLUTION INTRAVENOUS CONTINUOUS
Status: DISCONTINUED | OUTPATIENT
Start: 2023-10-04 | End: 2023-10-04

## 2023-10-04 RX ORDER — DEXAMETHASONE SODIUM PHOSPHATE 4 MG/ML
INJECTION, SOLUTION INTRA-ARTICULAR; INTRALESIONAL; INTRAMUSCULAR; INTRAVENOUS; SOFT TISSUE AS NEEDED
Status: DISCONTINUED | OUTPATIENT
Start: 2023-10-04 | End: 2023-10-04

## 2023-10-04 RX ADMIN — FENTANYL CITRATE 50 MCG: 50 INJECTION, SOLUTION INTRAMUSCULAR; INTRAVENOUS at 19:08

## 2023-10-04 RX ADMIN — LIDOCAINE HYDROCHLORIDE 100 MG: 20 INJECTION, SOLUTION INFILTRATION; PERINEURAL at 19:01

## 2023-10-04 RX ADMIN — PIPERACILLIN SODIUM AND TAZOBACTAM SODIUM 3.38 G: 3; .375 INJECTION, SOLUTION INTRAVENOUS at 17:08

## 2023-10-04 RX ADMIN — HYDROMORPHONE HYDROCHLORIDE 0.5 MG: 1 INJECTION, SOLUTION INTRAMUSCULAR; INTRAVENOUS; SUBCUTANEOUS at 19:52

## 2023-10-04 RX ADMIN — HYDROMORPHONE HYDROCHLORIDE 0.5 MG: 1 INJECTION, SOLUTION INTRAMUSCULAR; INTRAVENOUS; SUBCUTANEOUS at 20:12

## 2023-10-04 RX ADMIN — PROPOFOL 200 MG: 10 INJECTION, EMULSION INTRAVENOUS at 19:01

## 2023-10-04 RX ADMIN — SODIUM CHLORIDE, POTASSIUM CHLORIDE, SODIUM LACTATE AND CALCIUM CHLORIDE 100 ML/HR: 600; 310; 30; 20 INJECTION, SOLUTION INTRAVENOUS at 21:39

## 2023-10-04 RX ADMIN — VANCOMYCIN HYDROCHLORIDE 1500 MG: 10 INJECTION, POWDER, LYOPHILIZED, FOR SOLUTION INTRAVENOUS at 00:37

## 2023-10-04 RX ADMIN — OXYCODONE HYDROCHLORIDE 10 MG: 5 TABLET ORAL at 01:59

## 2023-10-04 RX ADMIN — MIDAZOLAM HYDROCHLORIDE 2 MG: 1 INJECTION, SOLUTION INTRAMUSCULAR; INTRAVENOUS at 18:58

## 2023-10-04 RX ADMIN — SODIUM CHLORIDE, SODIUM LACTATE, POTASSIUM CHLORIDE, AND CALCIUM CHLORIDE: 600; 310; 30; 20 INJECTION, SOLUTION INTRAVENOUS at 18:38

## 2023-10-04 RX ADMIN — PIPERACILLIN SODIUM AND TAZOBACTAM SODIUM 3.38 G: 3; .375 INJECTION, SOLUTION INTRAVENOUS at 04:00

## 2023-10-04 RX ADMIN — DEXAMETHASONE SODIUM PHOSPHATE 4 MG: 4 INJECTION, SOLUTION INTRAMUSCULAR; INTRAVENOUS at 19:15

## 2023-10-04 RX ADMIN — HYDROMORPHONE HYDROCHLORIDE 0.5 MG: 1 INJECTION, SOLUTION INTRAMUSCULAR; INTRAVENOUS; SUBCUTANEOUS at 19:59

## 2023-10-04 RX ADMIN — OXYCODONE HYDROCHLORIDE 5 MG: 5 TABLET ORAL at 20:17

## 2023-10-04 RX ADMIN — SODIUM CHLORIDE, POTASSIUM CHLORIDE, SODIUM LACTATE AND CALCIUM CHLORIDE 100 ML/HR: 600; 310; 30; 20 INJECTION, SOLUTION INTRAVENOUS at 12:15

## 2023-10-04 RX ADMIN — ONDANSETRON 4 MG: 2 INJECTION INTRAMUSCULAR; INTRAVENOUS at 19:15

## 2023-10-04 RX ADMIN — Medication 3 MG: at 21:38

## 2023-10-04 RX ADMIN — FENTANYL CITRATE 50 MCG: 50 INJECTION, SOLUTION INTRAMUSCULAR; INTRAVENOUS at 19:01

## 2023-10-04 RX ADMIN — PIPERACILLIN SODIUM AND TAZOBACTAM SODIUM 3.38 G: 3; .375 INJECTION, SOLUTION INTRAVENOUS at 12:15

## 2023-10-04 RX ADMIN — OXYCODONE HYDROCHLORIDE 10 MG: 5 TABLET ORAL at 17:09

## 2023-10-04 RX ADMIN — VANCOMYCIN HYDROCHLORIDE 1000 MG: 1 INJECTION, SOLUTION INTRAVENOUS at 13:08

## 2023-10-04 SDOH — HEALTH STABILITY: MENTAL HEALTH: CURRENT SMOKER: 0

## 2023-10-04 ASSESSMENT — PAIN - FUNCTIONAL ASSESSMENT
PAIN_FUNCTIONAL_ASSESSMENT: 0-10

## 2023-10-04 ASSESSMENT — PAIN SCALES - GENERAL
PAINLEVEL_OUTOF10: 3
PAINLEVEL_OUTOF10: 8
PAINLEVEL_OUTOF10: 3
PAINLEVEL_OUTOF10: 2
PAINLEVEL_OUTOF10: 3
PAINLEVEL_OUTOF10: 6
PAINLEVEL_OUTOF10: 7
PAINLEVEL_OUTOF10: 0 - NO PAIN
PAINLEVEL_OUTOF10: 0 - NO PAIN
PAINLEVEL_OUTOF10: 9
PAINLEVEL_OUTOF10: 6
PAINLEVEL_OUTOF10: 6
PAINLEVEL_OUTOF10: 8
PAINLEVEL_OUTOF10: 8
PAINLEVEL_OUTOF10: 7
PAINLEVEL_OUTOF10: 8

## 2023-10-04 ASSESSMENT — COGNITIVE AND FUNCTIONAL STATUS - GENERAL
DAILY ACTIVITIY SCORE: 24
MOBILITY SCORE: 24
MOBILITY SCORE: 24
DAILY ACTIVITIY SCORE: 24

## 2023-10-04 ASSESSMENT — PAIN DESCRIPTION - DESCRIPTORS: DESCRIPTORS: ACHING

## 2023-10-04 NOTE — PROGRESS NOTES
"Leonardo Hardy is a 27 y.o. female on day 1 of admission presenting with Abscess of groin, right.    Subjective   No acute events overnight. Pain pretty well controlled when not moving much.        Objective     Physical Exam  Vitals and nursing note reviewed.   Constitutional:       General: She is not in acute distress.     Appearance: Normal appearance. She is obese. She is not toxic-appearing.   HENT:      Head: Normocephalic and atraumatic.   Eyes:      Extraocular Movements: Extraocular movements intact.      Conjunctiva/sclera: Conjunctivae normal.   Cardiovascular:      Rate and Rhythm: Normal rate and regular rhythm.      Heart sounds: Normal heart sounds.   Pulmonary:      Effort: Pulmonary effort is normal. No respiratory distress.      Breath sounds: Normal breath sounds. No wheezing.   Abdominal:      General: Abdomen is flat. Bowel sounds are normal. There is no distension.      Palpations: Abdomen is soft.      Tenderness: There is no abdominal tenderness.   Musculoskeletal:         General: Normal range of motion.   Skin:     Capillary Refill: Capillary refill takes less than 2 seconds.      Comments: R thigh with induration and warmth    Neurological:      General: No focal deficit present.      Mental Status: She is alert and oriented to person, place, and time.   Psychiatric:         Mood and Affect: Mood normal.         Last Recorded Vitals  Blood pressure (!) 146/92, pulse 61, temperature 36.9 °C (98.4 °F), resp. rate 20, height 1.651 m (5' 5\"), weight 98.9 kg (218 lb), last menstrual period 07/17/2023, SpO2 100 %.  Intake/Output last 3 Shifts:  I/O last 3 completed shifts:  In: - (0 mL/kg)   Out: 1 (0 mL/kg) [Urine:1 (0 mL/kg/hr)]  Weight: 98.9 kg     Relevant Results  Results for orders placed or performed during the hospital encounter of 10/03/23 (from the past 24 hour(s))   Vancomycin, Trough   Result Value Ref Range    Vancomycin, Trough 22.9 (HH) 5.0 - 20.0 ug/mL   CBC   Result Value Ref " Range    WBC 7.5 4.4 - 11.3 x10*3/uL    nRBC 0.0 0.0 - 0.0 /100 WBCs    RBC 3.59 (L) 4.00 - 5.20 x10*6/uL    Hemoglobin 11.6 (L) 12.0 - 16.0 g/dL    Hematocrit 35.9 (L) 36.0 - 46.0 %     80 - 100 fL    MCH 32.3 26.0 - 34.0 pg    MCHC 32.3 32.0 - 36.0 g/dL    RDW 13.2 11.5 - 14.5 %    Platelets 155 150 - 450 x10*3/uL    MPV 9.2 7.5 - 11.5 fL   Basic Metabolic Panel   Result Value Ref Range    Glucose 99 74 - 99 mg/dL    Sodium 137 136 - 145 mmol/L    Potassium 4.3 3.5 - 5.3 mmol/L    Chloride 104 98 - 107 mmol/L    Bicarbonate 27 21 - 32 mmol/L    Anion Gap 10 10 - 20 mmol/L    Urea Nitrogen 10 6 - 23 mg/dL    Creatinine 0.81 0.50 - 1.05 mg/dL    eGFR >90 >60 mL/min/1.73m*2    Calcium 9.0 8.6 - 10.3 mg/dL     Medications:  enoxaparin, 40 mg, subcutaneous, Daily  melatonin, 3 mg, oral, Daily  [START ON 10/5/2023] pantoprazole, 40 mg, oral, Daily before breakfast   Or  [START ON 10/5/2023] pantoprazole, 40 mg, intravenous, Daily before breakfast  piperacillin-tazobactam, 3.375 g, intravenous, q6h  vancomycin, 1,000 mg, intravenous, q12h       PRN medications: acetaminophen **OR** acetaminophen **OR** acetaminophen, ketorolac, ketorolac, ondansetron ODT **OR** ondansetron, oxyCODONE     Assessment/Plan   Right groin abscess Left Breast Abscess   -Vancomycin  -Zosyn  -Appreciate ID recs   -General surgery for possible I&D later today   - n.p.o. after midnight  -IV hydration with lactated Ringer's  -Pain control     DVT Prophylaxis:  Lovenox subq               Jayy Red, [facility]

## 2023-10-04 NOTE — ANESTHESIA PROCEDURE NOTES
Airway  Date/Time: 10/4/2023 6:50 PM  Urgency: elective    Airway not difficult    Staffing  Performed: PALOMO   Authorized by: PALOMO Sawyer    Performed by: PALOMO Sawyer  Patient location during procedure: OR    Indications and Patient Condition  Indications for airway management: anesthesia  Spontaneous Ventilation: absent  Sedation level: deep  Preoxygenated: yes  Mask difficulty assessment: 0 - not attempted    Final Airway Details  Final airway type: supraglottic airway      Successful airway: air-Q  Size 4     Number of other approaches attempted: 0

## 2023-10-04 NOTE — H&P
History Of Present Illness  Leonardo Hardy is a 27 y.o. 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 complaining of right groin pain..  She endorses right groin lesion with abscess formation.  She also endorses left breast discomfort.  Her right groin pain has Maverick, progressively worse over the past week.  She had a Kenalog injection last week that seemed to make it worse.  Now she has an open area from the right going to the right buttock area with purulent bloody drainage.  The pain makes it difficult for her to walk.  She denies any fever chills shortness of breath chest pain abdominal pain nausea vomiting or diarrhea     Past Medical History  Carcinoid tumor status post partial colectomy  Hidradenitis suppurative  Class II obesity    Surgical History  Partial colectomy  Appendectomy     Social History  Denies ever smoking tobacco  Occasional alcoholic beverage  No illicit drug use    Family History  Reviewed and was noncontributory to her current problem     Allergies  Erythromycin, Fish derived, and Sulfa (sulfonamide antibiotics)    Review of Systems   Constitutional: Negative.    HENT: Negative.     Eyes: Negative.    Respiratory: Negative.     Cardiovascular: Negative.    Gastrointestinal: Negative.    Endocrine: Negative.    Genitourinary: Negative.    Musculoskeletal: Negative.    Skin:  Positive for color change, rash and wound.   Allergic/Immunologic: Negative.    Neurological: Negative.    Hematological: Negative.    Psychiatric/Behavioral: Negative.          Physical Exam  Constitutional:       Appearance: Normal appearance.   HENT:      Head: Normocephalic and atraumatic.      Right Ear: Tympanic membrane normal.      Left Ear: Tympanic membrane normal.      Nose: Nose normal.      Mouth/Throat:      Mouth: Mucous membranes  are dry.   Eyes:      Extraocular Movements: Extraocular movements intact.      Pupils: Pupils are equal, round, and reactive to light.   Cardiovascular:      Rate and Rhythm: Normal rate and regular rhythm.   Pulmonary:      Effort: Pulmonary effort is normal.      Breath sounds: Normal breath sounds.   Abdominal:      General: Abdomen is flat.      Palpations: Abdomen is soft.   Genitourinary:     General: Normal vulva.      Rectum: Normal.   Musculoskeletal:         General: Normal range of motion.      Cervical back: Normal range of motion and neck supple.   Skin:     General: Skin is warm and dry.      Capillary Refill: Capillary refill takes less than 2 seconds.      Findings: Erythema and lesion present.   Neurological:      General: No focal deficit present.      Mental Status: She is alert.   Psychiatric:         Mood and Affect: Mood normal.          Last Recorded Vitals  /87 (BP Location: Left arm, Patient Position: Sitting)   Pulse 103   Temp 37 °C (98.6 °F)   Resp 20   Wt 98.9 kg (218 lb)   SpO2 100%     Relevant Results  Results from last 7 days   Lab Units 10/03/23  1059   RBC AUTO x10*6/uL 4.01      Results from last 72 hours   Lab Units 10/03/23  1059   WBC AUTO x10*3/uL 9.8         Assessment/Plan   Right groin abscess  Plan  Vancomycin  Zosyn  General surgery for possible I&D  Regular diet n.p.o. after midnight  IV hydration with lactated Ringer's  Contributory factors hidradenitis suppurative  DVT prophylaxis  Lovenox 40 mg subcutaneously daily  Encourage ambulation  60 minutes spent on admission     Ger Huber DO

## 2023-10-04 NOTE — CONSULTS
INFECTIOUS DISEASE INPATIENT INITIAL CONSULTATION    Referred By: Ger Huber    Reason For Consult: right groin abscess    HPI:  This is a 27 y.o. female with PMH of hydradenitis suppurativa presenting with right groin pain and left breast pain.    Patient has history of left breast abscess in 5/2023 needing I/D. She has developed abscess in the right groin/thigh now and also a small spot under the left breast again. Is on IV Vanc/Zosyn. No fevers/chills. Plans for OR for I/D.     Allergies:  Erythromycin, Fish containing products, Fish derived, and Sulfa (sulfonamide antibiotics)     Vitals (Last 24 Hours):  Heart Rate:  [62-75]   Temp:  [36.2 °C (97.2 °F)-36.4 °C (97.6 °F)]   Resp:  [16-17]   BP: (117-137)/(75-82)   SpO2:  [98 %-100 %]      PHYSICAL EXAM:  Gen - NAD  Heart - RRR  Breast - left breast medial/underside with small abscess - no active drainage  Lungs - clear bilaterally, no wheezing  Abd - soft, no ttp, BS present  Right Groin - indurated/tender with a couple focal areas of fluctuance  Skin - no rash    MEDS:    Current Facility-Administered Medications:     acetaminophen (Tylenol) tablet 650 mg, 650 mg, oral, q4h PRN **OR** acetaminophen (Tylenol) oral liquid 650 mg, 650 mg, oral, q4h PRN **OR** acetaminophen (Tylenol) suppository 650 mg, 650 mg, rectal, q4h PRN, Ger Huber DO    enoxaparin (Lovenox) syringe 40 mg, 40 mg, subcutaneous, Daily, Jayy Red,     ketorolac (Toradol) injection 15 mg, 15 mg, intravenous, q6h PRN, Jayy Red,     ketorolac (Toradol) injection 30 mg, 30 mg, intravenous, q6h PRN, Jayy Red,     lactated Ringer's infusion, 100 mL/hr, intravenous, Continuous, Ger Huber DO    melatonin tablet 3 mg, 3 mg, oral, Daily, Ger Huber DO    ondansetron ODT (Zofran-ODT) disintegrating tablet 4 mg, 4 mg, oral, q8h PRN **OR** ondansetron (Zofran) injection 4 mg, 4 mg, intravenous, q8h PRN, Ger Huber DO    oxyCODONE (Roxicodone) immediate  release tablet 10 mg, 10 mg, oral, q6h PRN, Jayy Red, DO, 10 mg at 10/04/23 0159    [START ON 10/5/2023] pantoprazole (ProtoNix) EC tablet 40 mg, 40 mg, oral, Daily before breakfast **OR** [START ON 10/5/2023] pantoprazole (ProtoNix) injection 40 mg, 40 mg, intravenous, Daily before breakfast, Ger G Salomone, DO    piperacillin-tazobactam-dextrose (Zosyn) IV 3.375 g, 3.375 g, intravenous, q6h, Ger G Salomone, DO, Last Rate: 100 mL/hr at 10/04/23 0400, 3.375 g at 10/04/23 0400     LABS:  Lab Results   Component Value Date    WBC 7.5 10/04/2023    HGB 11.6 (L) 10/04/2023    HCT 35.9 (L) 10/04/2023     10/04/2023     10/04/2023      Results from last 72 hours   Lab Units 10/04/23  0518   SODIUM mmol/L 137   POTASSIUM mmol/L 4.3   CHLORIDE mmol/L 104   CO2 mmol/L 27   BUN mg/dL 10   CREATININE mg/dL 0.81   GLUCOSE mg/dL 99   CALCIUM mg/dL 9.0   ANION GAP mmol/L 10   EGFR mL/min/1.73m*2 >90     Results from last 72 hours   Lab Units 10/03/23  1059   ALK PHOS U/L 63   BILIRUBIN TOTAL mg/dL 1.2   PROTEIN TOTAL g/dL 7.5   ALT U/L 22   AST U/L 12   ALBUMIN g/dL 4.4     Estimated Creatinine Clearance: 119.4 mL/min (by C-G formula based on SCr of 0.81 mg/dL).     IMAGING:  CT A/P  FINDINGS:  In the area of clinical concern which was the proximal medial right  thigh, there was an area medial skin thickening and edema of the  subcutaneous fat with a small abscess in the medial subcutaneous fat  measuring 23 x 20 mm, on axial image 178/200. There is mild  thickening the fascia overlying the musculature of the medial aspect  of the proximal to mid right thigh. Just cephalad and posterior to  this abscess is a 2nd smaller abscess that measures 31 x 12  mm, on  axial image 168/200. This particular abscess is at the posterior base  of the right labia majora.      For additional information, please see the report just dictated for  the 1:30 p.m. exam.      IMPRESSION:  There are 2 abscesses in the subcutaneous fat  of the proximal to mid  medial right thigh as described. Please see above for details.    ASSESSMENT/PLAN:    Hydradenitis Suppurativa - chronic condition with acute flare  Left Breast Abscess - acute  Right Groin Abscess - acute    Not systemically ill but abscesses deep and need I/D especially since not spontaneously draining at all. IV antibiotics needed.    Will continue on IV Vanc/Zosyn until culture data is here to narrow down more.   Monitoring for adverse effects of abx such as rash/itching/diarrhea. Monitoring Vancomycin levels and renal function.    Will follow. Thanks!    Jerzy Masters MD  [facility]  Office #[phone]

## 2023-10-04 NOTE — CARE PLAN
Problem: Fall/Injury  Goal: Not fall by end of shift  Outcome: Progressing  Goal: Be free from injury by end of the shift  Outcome: Progressing  Goal: Verbalize understanding of personal risk factors for fall in the hospital  Outcome: Progressing  Goal: Verbalize understanding of risk factor reduction measures to prevent injury from fall in the home  Outcome: Progressing  Goal: Use assistive devices by end of the shift  Outcome: Progressing  Goal: Pace activities to prevent fatigue by end of the shift  Outcome: Progressing     Problem: Pain  Goal: Takes deep breaths with improved pain control throughout the shift  Outcome: Progressing  Goal: Turns in bed with improved pain control throughout the shift  Outcome: Progressing  Goal: Walks with improved pain control throughout the shift  Outcome: Progressing  Goal: Performs ADL's with improved pain control throughout shift  Outcome: Progressing  Goal: Participates in PT with improved pain control throughout the shift  Outcome: Progressing  Goal: Free from opioid side effects throughout the shift  Outcome: Progressing  Goal: Free from acute confusion related to pain meds throughout the shift  Outcome: Progressing   The patient's goals for the shift include reduce pain    The clinical goals for the shift include reduce pain    Over the shift, the patient did not make progress toward the following goals. Barriers to progression include . Recommendations to address these barriers include .

## 2023-10-04 NOTE — PROGRESS NOTES
Leonardo Hardy is a 27 y.o. female on day 1 of admission presenting with Abscess of groin, right.     Met with patient and boyfriend  Patient from home. She is independent with all care and ambulation, no c services  Pcp is Kobe Price. She is able to get to appts and afford meds  No dc needs.  ? Watch for id ab recs? Last visit, for same dx, was sent home with oral abs                 Assessment/Plan   Principal Problem:    Abscess of groin, right  Active Problems:    Left breast abscess               Dilma Louise RN

## 2023-10-04 NOTE — CONSULTS
Vancomycin Dosing by Pharmacy- Cessation of Therapy    Consult to pharmacy for vancomycin dosing has been discontinued by the prescriber, pharmacy will sign off at this time.    Please call pharmacy if there are further questions or re-enter a consult if vancomycin is resumed.     Kimberyl MANRIQUEZD

## 2023-10-04 NOTE — PROGRESS NOTES
"Vancomycin Dosing by Pharmacy- FOLLOW UP    Leonardo Hardy is a 27 y.o. year old female who Pharmacy has been consulted for vancomycin dosing for cellulitis, skin and soft tissue. Based on the patient's indication and renal status this patient is being dosed based on a goal AUC of 400-600.     Renal function is currently stable.    Current vancomycin dose: 1500 mg given every 12 hours    Estimated vancomycin AUC on current dose: 705 mg/L.hr     Visit Vitals  /79   Pulse 62   Temp 36.2 °C (97.2 °F)   Resp 17        Lab Results   Component Value Date    CREATININE 0.81 10/04/2023    CREATININE 0.69 10/03/2023    CREATININE CANCELED 06/01/2023    CREATININE 0.78 05/31/2023    CREATININE 0.82 05/30/2023    CREATININE 0.71 05/29/2023        Patient weight is No results found for: \"PTWEIGHT\"    No results found for: \"CULTURE\"     I/O last 3 completed shifts:  In: - (0 mL/kg)   Out: 1 (0 mL/kg) [Urine:1 (0 mL/kg/hr)]  Weight: 98.9 kg   [unfilled]    No results found for: \"PATIENTTEMP\"     Assessment/Plan    Above goal AUC. Orders placed for new vancomcyin regimen of 1000 every 12 hours to begin at 1300.    This dosing regimen is predicted by [employer] to result in the following pharmacokinetic parameters:  Exposure target: AUC24 (range)400-600 mg/L.hr   AUC24,ss: 471 mg/L.hr  Probability of AUC24 > 400: 77 %  Ctrough,ss: 12.5 mg/L  Probability of Ctrough,ss > 20: 6 %  Probability of nephrotoxicity (Lodise BETO 2009): 8 %    The next level will be obtained on 10/5 at 0500. May be obtained sooner if clinically indicated.   Will continue to monitor renal function daily while on vancomycin and order serum creatinine at least every 48 hours if not already ordered.  Follow for continued vancomycin needs, clinical response, and signs/symptoms of toxicity.       Misty Serrano, PharmD           "

## 2023-10-04 NOTE — CONSULTS
Consults    Reason For Consult  Hidradenitis    Assessment/Plan   Right inner thigh/inguinal abscess related to hidradenitis.  I have advised surgical drainage in the OR.  All questions answered and she is agreeable.  Please keep n.p.o.      History Of Present Illness  Leonardo Hardy is a 27 y.o. female presenting with Recurrent right inner thigh pain and swelling.  She has a known history of hidradenitis.  I drained abscesses on her in the past..     Past Medical History  She has a past medical history of Asthma and Colon cancer (CMS/[facility]ly [facility]).    Surgical History  She has a past surgical history that includes CT angio neck w and wo IV contrast (12/26/2020).     Social History  She reports that she has never smoked. She has never used smokeless tobacco. She reports current alcohol use of about 1.0 standard drink of alcohol per week. She reports current drug use. Frequency: 1.00 time per week. Drug: Marijuana.    Family History  No family history on file.     Allergies  Erythromycin, Fish containing products, Fish derived, and Sulfa (sulfonamide antibiotics)    Review of Systems   Constitutional: no weight loss, no fevers, no malaise  HEENT: negative  Neck: negative  Pulmonary: no SOB, no cough  CV: no chest pain, otherwise negative  GI: no pain, no diarrhea, no bloody stools, no constipation  : no hematuria, retention.  MS: no aches/pains  Neurologic: negative  Skin: no rashes, lesions  HEME: no bleeding tendency, no bruising  Psych: no mood issues  Physical Exam   General: well appearing, no acute distress, well nourished  HEENT: normal  Neck: supple  Pulmonary: lungs clear to auscultation bilaterally  CV: RR, S1S2, no murmurs.  Pulses palpable and equal.  Good capillary refill  Abdomen: soft, non tender, no masses  : grossly normal external genitalia  MS: grossly normal  Neurologic: alert and oriented, strength/sensation intact  Skin: Right inner thigh with fluctuant indurated tender tissue consistent with  "abscess  Psych: mood appropriate  Last Recorded Vitals  Blood pressure 132/79, pulse 62, temperature 36.2 °C (97.2 °F), resp. rate 17, height 1.651 m (5' 5\"), weight 98.9 kg (218 lb), last menstrual period 07/17/2023, SpO2 100 %.    Relevant Results  Lab Results   Component Value Date    WBC 7.5 10/04/2023    HGB 11.6 (L) 10/04/2023    HCT 35.9 (L) 10/04/2023     10/04/2023     10/04/2023       tudy Result    Narrative & Impression   Interpreted By:  Philipp Yanez,   STUDY:  CT ABDOMEN PELVIS WO IV CONTRAST;  10/3/2023 2:35 pm      INDICATION:  Signs/Symptoms:abscess, first scan not low enough. Only need pelvis  through midthigh.      COMPARISON:  The exam was done immediately after a CT scan of the abdomen and  pelvis with contrast earlier today at 1:30 p.m.. The images from that  exam apparently did not include the area of interest which was the  medial upper right..      ACCESSION NUMBER(S):  [id number]      ORDERING CLINICIAN:  DEAN PANDEY      TECHNIQUE:  Repeat axial images were obtained from the xiphoid down through the  mid thighs.      FINDINGS:  In the area of clinical concern which was the proximal medial right  thigh, there was an area medial skin thickening and edema of the  subcutaneous fat with a small abscess in the medial subcutaneous fat  measuring 23 x 20 mm, on axial image 178/200. There is mild  thickening the fascia overlying the musculature of the medial aspect  of the proximal to mid right thigh. Just cephalad and posterior to  this abscess is a 2nd smaller abscess that measures 31 x 12  mm, on  axial image 168/200. This particular abscess is at the posterior base  of the right labia majora.      For additional information, please see the report just dictated for  the 1:30 p.m. exam.      IMPRESSION:  There are 2 abscesses in the subcutaneous fat of the proximal to mid  medial right thigh as described. Please see above for details.           I spent 30 minutes in the " professional and overall care of this patient.

## 2023-10-04 NOTE — PROGRESS NOTES
"Vancomycin Dosing by Pharmacy- INITIAL    Leonardo Hardy is a 27 y.o. year old female who Pharmacy has been consulted for vancomycin dosing for cellulitis, skin and soft tissue. Based on the patient's indication and renal status this patient will be dosed based on a goal AUC of 400-600.     Renal function is currently stable.    Visit Vitals  /75 (Patient Position: Lying)   Pulse 75   Temp 36.4 °C (97.6 °F) (Temporal)   Resp 16        Lab Results   Component Value Date    CREATININE 0.69 10/03/2023    CREATININE CANCELED 06/01/2023    CREATININE 0.78 05/31/2023    CREATININE 0.82 05/30/2023    CREATININE 0.71 05/29/2023        Patient weight is 98.9 kg    No results found for: \"CULTURE\"     No intake/output data recorded.  [unfilled]    No results found for: \"PATIENTTEMP\"       Assessment/Plan    Will initiate vancomycin maintenance dose of 1,500 mg every 12 hours.    This dosing regimen is predicted by [employer] to result in the following pharmacokinetic parameters:    AUC24,ss: 547 mg/L.hr  Probability of AUC24 > 400: 81 %  Ctrough,ss: 16.7 mg/L  Probability of Ctrough,ss > 20: 35 %  Probability of nephrotoxicity (Lodise BETO 2009): 12 %    Follow-up level will be ordered on 10/4/23 at 05:00 unless clinically indicated sooner.  Will continue to monitor renal function daily while on vancomycin and order serum creatinine at least every 48 hours if not already ordered.  Follow for continued vancomycin needs, clinical response, and signs/symptoms of toxicity.       Zora Eller, PharmD       "

## 2023-10-04 NOTE — PROGRESS NOTES
"Vancomycin Dosing by Pharmacy- FOLLOW UP    Leonardo Hardy is a 27 y.o. year old female who Pharmacy has been consulted for vancomycin dosing for cellulitis, skin and soft tissue. Based on the patient's indication and renal status this patient is being dosed based on a goal AUC of 400-600.     Renal function is currently stable.    Current vancomycin dose: 1500 mg given every 12 hours    Estimated vancomycin AUC on current dose: 705 mg/L.hr     Visit Vitals  /75 (Patient Position: Lying)   Pulse 75   Temp 36.4 °C (97.6 °F) (Temporal)   Resp 16        Lab Results   Component Value Date    CREATININE 0.81 10/04/2023    CREATININE 0.69 10/03/2023    CREATININE CANCELED 06/01/2023    CREATININE 0.78 05/31/2023    CREATININE 0.82 05/30/2023    CREATININE 0.71 05/29/2023        Patient weight is No results found for: \"PTWEIGHT\"    No results found for: \"CULTURE\"     I/O last 3 completed shifts:  In: - (0 mL/kg)   Out: 1 (0 mL/kg) [Urine:1 (0 mL/kg/hr)]  Weight: 98.9 kg   [unfilled]    No results found for: \"PATIENTTEMP\"     Assessment/Plan    Above goal AUC - dose will be held for 10/4    This dosing regimen is predicted by [employer] to result in the following pharmacokinetic parameters:    Regimen: 1500 mg IV every 12 hours.  Start time: 12:37 on 10/04/2023  Exposure target: AUC24 (range)400-600 mg/L.hr   AUC24,ss: 705 mg/L.hr  Probability of AUC24 > 400: 100 %  Ctrough,ss: 19 mg/L  Probability of Ctrough,ss > 20: 44 %  Probability of nephrotoxicity (Lodise BETO 2009): 16 %    The next level will be obtained on 10/5 at 0500. May be obtained sooner if clinically indicated.   Will continue to monitor renal function daily while on vancomycin and order serum creatinine at least every 48 hours if not already ordered.  Follow for continued vancomycin needs, clinical response, and signs/symptoms of toxicity.       Betty Mariee, PharmD           "

## 2023-10-04 NOTE — OP NOTE
Right Thigh Incision and Drainage (R) Operative Note     Date: 10/3/2023 - 10/4/2023  OR Location: [facility]    Name: Leonardo Hardy, : 1995, Age: 27 y.o., MRN: [id number], Sex: female    Diagnosis  Right inner thigh abscess Post op same      Procedures  Exam under anesthesia, drainage of right inner thigh abscess complex.  Placement of Penrose drain    Surgeons      * Ibrahima Castillo - Primary    Resident/Fellow/Other Assistant:  PGY 3    Procedure Summary  Anesthesia: LMA ASA: II  Anesthesia Staff: Gabrielle  Estimated Blood Loss: 25mL  Intra-op Medications:         Specimen:   ID Type Source Tests Collected by Time   1 : Buttock Abcess Tissue ABSCESS TISSUE/WOUND CULTURE/SMEAR Ibrahima Castillo MD 10/4/2023 1912        Staff:   Circulator: Aparna Lipscomb RN  Scrub Person: Abi Stevens         Drains and/or Catheters: Quarter inch Penrose    Tourniquet Times:         Implants:     Findings: Large amount of pus    Indications: Leonardo Hardy is an 27 y.o. female who is having surgery for inner thigh abscess    The patient was seen in the preoperative area. The risks, benefits, complications, treatment options, non-operative alternatives, expected recovery and outcomes were discussed with the patient. The possibilities of reaction to medication, pulmonary aspiration, injury to surrounding structures, bleeding, recurrent infection, the need for additional procedures, failure to diagnose a condition, and creating a complication requiring transfusion or operation were discussed with the patient. The patient concurred with the proposed plan, giving informed consent.  The site of surgery was properly noted/marked if necessary per policy. The patient has been actively warmed in preoperative area. Preoperative antibiotics have been ordered and given within 2 hours of incision. Venous thrombosis prophylaxis have been ordered including bilateral sequential compression devices    Procedure Details: Patient was consented  for drainage of a right inner thigh abscess.  The site was marked.  Risks and benefits were detailed and consent was obtained    She is brought to the OR placed supine initially.  Timeout was performed confirm patient procedure.  Antibiotics are being given therapeutically.  An LMA tube was placed.  We put her in lithotomy position then prepped and draped with Betadine.  We then injected local anesthetic and made 2 incisions over the indurated fluctuant area corresponding to CT scan findings.  We broke into large abscess cavities with her hemostat.  We then cultured the wound cavity.  We then irrigated with saline until the effluent was clear.  We looped a Penrose drain through the 2 incisions and the ends were sutured together with 2-0 silk.  Clean pressure dressings were applied and the patient ultimately was extubated and transferred recovery room satisfactory condition    Complications:  None; patient tolerated the procedure well.    Disposition: PACU - hemodynamically stable.  Condition: stable         Additional Details:     Attending Attestation: I was present and scrubbed for the entire procedure.    Ibrahima Castillo  Phone Number: [phone]

## 2023-10-04 NOTE — ANESTHESIA PREPROCEDURE EVALUATION
Patient: Leonardo Hardy    Procedure Information       Date/Time: 10/04/23 1930    Procedure: Right Thigh Incision and Drainage (Right)    Location: [facility] / [facility]    Surgeons: Ibrahima Castillo MD          28 yo F s/f asthma, colon Ca s/p resection, hidradentitis with groin abscess s/f I&D.  No issues with anesthesia.  Appropriately NPO.  Occasional THC.    Relevant Problems   Infectious Disease   (+) Hidradenitis suppurativa       Clinical information reviewed:   Tobacco  Allergies  Meds  Problems  Med Hx  Surg Hx  OB Status    Fam Hx  Soc Hx        NPO Detail:  NPO/Void Status  Date of Last Liquid: 10/04/23  Time of Last Liquid: 1630  Date of Last Solid: 10/03/23  Time of Last Solid: 2000         Physical Exam    Airway  Mallampati: III  TM distance: >3 FB  Neck ROM: full  Comments: Nose ring that does not come out.  Took out all other facial piercings   Cardiovascular   Rate: normal     Dental - normal exam     Pulmonary   Breath sounds clear to auscultation     Abdominal            Anesthesia Plan    ASA 2     general     The patient is not a current smoker.    intravenous induction   Postoperative administration of opioids is intended.  Anesthetic plan and risks discussed with patient.    Plan discussed with [facility].

## 2023-10-05 VITALS
HEIGHT: 65 IN | SYSTOLIC BLOOD PRESSURE: 122 MMHG | RESPIRATION RATE: 18 BRPM | HEART RATE: 55 BPM | TEMPERATURE: 98.3 F | WEIGHT: 218.03 LBS | BODY MASS INDEX: 36.33 KG/M2 | OXYGEN SATURATION: 100 % | DIASTOLIC BLOOD PRESSURE: 59 MMHG

## 2023-10-05 PROBLEM — N61.1 LEFT BREAST ABSCESS: Status: RESOLVED | Noted: 2023-10-03 | Resolved: 2023-10-05

## 2023-10-05 LAB
ANION GAP SERPL CALC-SCNC: 14 MMOL/L (ref 10–20)
BUN SERPL-MCNC: 8 MG/DL (ref 6–23)
CALCIUM SERPL-MCNC: 8.8 MG/DL (ref 8.6–10.3)
CHLORIDE SERPL-SCNC: 103 MMOL/L (ref 98–107)
CO2 SERPL-SCNC: 25 MMOL/L (ref 21–32)
CREAT SERPL-MCNC: 0.7 MG/DL (ref 0.5–1.05)
ERYTHROCYTE [DISTWIDTH] IN BLOOD BY AUTOMATED COUNT: 13.1 % (ref 11.5–14.5)
GFR SERPL CREATININE-BSD FRML MDRD: >90 ML/MIN/1.73M*2
GLUCOSE SERPL-MCNC: 156 MG/DL (ref 74–99)
HCT VFR BLD AUTO: 36.4 % (ref 36–46)
HGB BLD-MCNC: 11.8 G/DL (ref 12–16)
MCH RBC QN AUTO: 32.4 PG (ref 26–34)
MCHC RBC AUTO-ENTMCNC: 32.4 G/DL (ref 32–36)
MCV RBC AUTO: 100 FL (ref 80–100)
NRBC BLD-RTO: 0 /100 WBCS (ref 0–0)
PLATELET # BLD AUTO: 165 X10*3/UL (ref 150–450)
PMV BLD AUTO: 9.7 FL (ref 7.5–11.5)
POTASSIUM SERPL-SCNC: 4 MMOL/L (ref 3.5–5.3)
RBC # BLD AUTO: 3.64 X10*6/UL (ref 4–5.2)
SODIUM SERPL-SCNC: 138 MMOL/L (ref 136–145)
VANCOMYCIN TROUGH SERPL-MCNC: 12.1 UG/ML (ref 5–20)
WBC # BLD AUTO: 6.6 X10*3/UL (ref 4.4–11.3)

## 2023-10-05 PROCEDURE — 2500000004 HC RX 250 GENERAL PHARMACY W/ HCPCS (ALT 636 FOR OP/ED): Performed by: INTERNAL MEDICINE

## 2023-10-05 PROCEDURE — 85027 COMPLETE CBC AUTOMATED: CPT | Performed by: INTERNAL MEDICINE

## 2023-10-05 PROCEDURE — 99239 HOSP IP/OBS DSCHRG MGMT >30: CPT | Performed by: INTERNAL MEDICINE

## 2023-10-05 PROCEDURE — G0378 HOSPITAL OBSERVATION PER HR: HCPCS

## 2023-10-05 PROCEDURE — 99232 SBSQ HOSP IP/OBS MODERATE 35: CPT

## 2023-10-05 PROCEDURE — 2500000004 HC RX 250 GENERAL PHARMACY W/ HCPCS (ALT 636 FOR OP/ED): Performed by: ANESTHESIOLOGY

## 2023-10-05 PROCEDURE — 80202 ASSAY OF VANCOMYCIN: CPT | Performed by: INTERNAL MEDICINE

## 2023-10-05 PROCEDURE — 80048 BASIC METABOLIC PNL TOTAL CA: CPT | Performed by: INTERNAL MEDICINE

## 2023-10-05 PROCEDURE — 2500000001 HC RX 250 WO HCPCS SELF ADMINISTERED DRUGS (ALT 637 FOR MEDICARE OP): Performed by: INTERNAL MEDICINE

## 2023-10-05 PROCEDURE — 36415 COLL VENOUS BLD VENIPUNCTURE: CPT | Performed by: INTERNAL MEDICINE

## 2023-10-05 RX ORDER — AMOXICILLIN AND CLAVULANATE POTASSIUM 875; 125 MG/1; MG/1
1 TABLET, FILM COATED ORAL 2 TIMES DAILY
Qty: 28 TABLET | Refills: 0 | Status: SHIPPED | OUTPATIENT
Start: 2023-10-05 | End: 2023-10-19

## 2023-10-05 RX ORDER — DOXYCYCLINE 100 MG/1
100 CAPSULE ORAL 2 TIMES DAILY
Qty: 28 CAPSULE | Refills: 0 | Status: SHIPPED | OUTPATIENT
Start: 2023-10-05 | End: 2023-10-19

## 2023-10-05 RX ORDER — OXYCODONE HYDROCHLORIDE 5 MG/1
5 TABLET ORAL EVERY 6 HOURS PRN
Qty: 15 TABLET | Refills: 0 | OUTPATIENT
Start: 2023-10-05 | End: 2024-05-27

## 2023-10-05 RX ORDER — FLUCONAZOLE 150 MG/1
150 TABLET ORAL ONCE
Qty: 1 TABLET | Refills: 0 | Status: SHIPPED | OUTPATIENT
Start: 2023-10-05 | End: 2023-10-05

## 2023-10-05 RX ADMIN — OXYCODONE HYDROCHLORIDE 10 MG: 5 TABLET ORAL at 04:56

## 2023-10-05 RX ADMIN — ENOXAPARIN SODIUM 40 MG: 40 INJECTION SUBCUTANEOUS at 09:00

## 2023-10-05 RX ADMIN — HYDROMORPHONE HYDROCHLORIDE 0.5 MG: 1 INJECTION, SOLUTION INTRAMUSCULAR; INTRAVENOUS; SUBCUTANEOUS at 00:41

## 2023-10-05 RX ADMIN — PIPERACILLIN SODIUM AND TAZOBACTAM SODIUM 3.38 G: 3; .375 INJECTION, SOLUTION INTRAVENOUS at 06:07

## 2023-10-05 RX ADMIN — PIPERACILLIN SODIUM AND TAZOBACTAM SODIUM 3.38 G: 3; .375 INJECTION, SOLUTION INTRAVENOUS at 14:47

## 2023-10-05 RX ADMIN — PANTOPRAZOLE SODIUM 40 MG: 40 TABLET, DELAYED RELEASE ORAL at 09:26

## 2023-10-05 RX ADMIN — PIPERACILLIN SODIUM AND TAZOBACTAM SODIUM 3.38 G: 3; .375 INJECTION, SOLUTION INTRAVENOUS at 00:06

## 2023-10-05 RX ADMIN — VANCOMYCIN HYDROCHLORIDE 1000 MG: 1 INJECTION, SOLUTION INTRAVENOUS at 01:01

## 2023-10-05 RX ADMIN — OXYCODONE HYDROCHLORIDE 10 MG: 5 TABLET ORAL at 09:26

## 2023-10-05 ASSESSMENT — PAIN - FUNCTIONAL ASSESSMENT
PAIN_FUNCTIONAL_ASSESSMENT: 0-10
PAIN_FUNCTIONAL_ASSESSMENT: 0-10

## 2023-10-05 ASSESSMENT — COGNITIVE AND FUNCTIONAL STATUS - GENERAL
DAILY ACTIVITIY SCORE: 18
EATING MEALS: A LITTLE
MOVING FROM LYING ON BACK TO SITTING ON SIDE OF FLAT BED WITH BEDRAILS: A LITTLE
DRESSING REGULAR UPPER BODY CLOTHING: A LITTLE
WALKING IN HOSPITAL ROOM: A LITTLE
TOILETING: A LITTLE
PERSONAL GROOMING: A LITTLE
DRESSING REGULAR LOWER BODY CLOTHING: A LITTLE
STANDING UP FROM CHAIR USING ARMS: A LITTLE
TURNING FROM BACK TO SIDE WHILE IN FLAT BAD: A LITTLE
MOVING TO AND FROM BED TO CHAIR: A LITTLE
HELP NEEDED FOR BATHING: A LITTLE

## 2023-10-05 ASSESSMENT — PAIN SCALES - GENERAL
PAINLEVEL_OUTOF10: 7
PAINLEVEL_OUTOF10: 8
PAINLEVEL_OUTOF10: 7

## 2023-10-05 NOTE — PERIOPERATIVE NURSING NOTE
2038 Update called to Melanie MOSS. Pt resting comfortably. Awaiting transport.  2058 Pt able to void on bedpan without difficulty. ABD fell into bedpan. Pt dressing changed at this time. No active bleeding or new drainage at this time.  2105 Pt tx to inpt room in stable condition. All belongings with pt at time of tx. Per pt she wants to update her mom herself and no need to call family at this time.

## 2023-10-05 NOTE — DISCHARGE SUMMARY
Discharge Diagnosis  Abscess of groin, right    Issues Requiring Follow-Up  Wound cx results     Discharge Meds     Your medication list        START taking these medications        Instructions Last Dose Given Next Dose Due   amoxicillin-pot clavulanate 875-125 mg tablet  Commonly known as: Augmentin      Take 1 tablet (875 mg) by mouth 2 times a day for 14 days.       doxycycline 100 mg capsule  Commonly known as: Vibramycin      Take 1 capsule (100 mg) by mouth 2 times a day for 14 days. Take with at least 8 ounces (large glass) of water, do not lie down for 30 minutes after       oxyCODONE 5 mg immediate release tablet  Commonly known as: Roxicodone      Take 1 tablet (5 mg) by mouth every 6 hours if needed for moderate pain (4 - 6).              CONTINUE taking these medications        Instructions Last Dose Given Next Dose Due   valACYclovir 500 mg tablet  Commonly known as: Valtrex                     Where to Get Your Medications        These medications were sent to [id number] - [place], [place] - [address] AT [facility]  [address], [facility] [address]      Phone: [phone]   amoxicillin-pot clavulanate 875-125 mg tablet  doxycycline 100 mg capsule  oxyCODONE 5 mg immediate release tablet         Test Results Pending At Discharge  Pending Labs       Order Current Status    Tissue/Wound Culture/Smear In process            Hospital Course   Leonardo was in the hospital with a an abscess in her groin.  She was evaluated by the surgery team as well as the infectious disease team.  She did go to the OR for an I&D of that wound.  Cultures were sent.  The infectious disease team did keep her on IV antibiotics and patient was fairly anxious to leave the hospital and so it is decided that patient can go home on Augmentin and doxycycline and which will reliably cover most things that could potentially be growing in this abscess.  She will need to follow-up with  her primary care physician for final wound care culture results which she noted understanding.  At this time she is otherwise hemodynamically stable and appropriate discharge.  This plan was discussed with her and she is agreement.  All questions were answered.    Pertinent Physical Exam At Time of Discharge  Physical Exam  Vitals and nursing note reviewed.   Constitutional:       General: She is not in acute distress.     Appearance: Normal appearance. She is obese.   HENT:      Head: Normocephalic and atraumatic.   Cardiovascular:      Rate and Rhythm: Normal rate and regular rhythm.      Heart sounds: Normal heart sounds.   Musculoskeletal:         General: Normal range of motion.   Skin:     Capillary Refill: Capillary refill takes less than 2 seconds.      Comments: R groin with dressing c/d/I    Neurological:      General: No focal deficit present.      Mental Status: She is alert and oriented to person, place, and time.   Psychiatric:         Mood and Affect: Mood normal.         Behavior: Behavior normal.         Outpatient Follow-Up  No future appointments.      Jayy Red DO    Time spent during this discharge: >30min

## 2023-10-05 NOTE — ANESTHESIA POSTPROCEDURE EVALUATION
Patient: Leonardo Hardy    Procedure Summary       Date: 10/04/23 Room / Location: [facility] / [facility]    Anesthesia Start: 1854 Anesthesia Stop: 1933    Procedure: Right Thigh Incision and Drainage (Right) Diagnosis:       Abscess of groin, right      Left breast abscess      (Abscess of groin, right [L02.214])      (Left breast abscess [N61.1])    Surgeons: Ibrahima Castillo MD Responsible Provider: Fili Quiles MD    Anesthesia Type: general ASA Status: 2            Anesthesia Type: general    Vitals Value Taken Time   /62 10/04/23 2030   Temp 36.3 °C (97.3 °F) 10/04/23 2030   Pulse 51 10/04/23 2030   Resp 14 10/04/23 2030   SpO2 100 % 10/04/23 2015       Anesthesia Post Evaluation    Patient location during evaluation: PACU  Patient participation: complete - patient participated  Level of consciousness: awake and alert  Pain management: satisfactory to patient  Multimodal analgesia pain management approach  Airway patency: patent  Cardiovascular status: acceptable and blood pressure returned to baseline  Respiratory status: acceptable  Hydration status: acceptable        No notable events documented.

## 2023-10-05 NOTE — PROGRESS NOTES
"  INFECTIOUS DISEASE DAILY PROGRESS NOTE    SUBJECTIVE:    No overnight events. Afebrile. No diarrhea. Had gone to OR yesterday for I/D of the right thigh - penrose in place now. Sore in thigh but otherwise feels OK. Left breast is a little better also.    OBJECTIVE:  VITALS (Last 24 Hours)  /68   Pulse 63   Temp 36.2 °C (97.2 °F)   Resp 18   Ht 1.651 m (5' 5\")   Wt 98.9 kg (218 lb 0.6 oz)   LMP 07/17/2023 (Approximate)   SpO2 100%   BMI 36.28 kg/m²     PHYSICAL EXAM:  Gen - NAD  Heart - RRR  Lungs - no wheezing  Abd - soft, no ttp  Right Groin/Thigh - dressings present with penrose drain  Skin - no rash    MEDS:    Current Facility-Administered Medications:     acetaminophen (Tylenol) tablet 650 mg, 650 mg, oral, q4h PRN **OR** acetaminophen (Tylenol) oral liquid 650 mg, 650 mg, oral, q4h PRN **OR** acetaminophen (Tylenol) suppository 650 mg, 650 mg, rectal, q4h PRN, Ger Huber DO    diphenhydrAMINE (BENADryl) injection 12.5 mg, 12.5 mg, intravenous, Once PRN, Fili Quiles MD    enoxaparin (Lovenox) syringe 40 mg, 40 mg, subcutaneous, Daily, Jayy Red DO    HYDROmorphone (Dilaudid) injection 0.2 mg, 0.2 mg, intravenous, q5 min PRN, Fili Quiles MD    HYDROmorphone (Dilaudid) injection 0.5 mg, 0.5 mg, intravenous, q5 min PRN, Fili Quiles MD, 0.5 mg at 10/05/23 0041    ketorolac (Toradol) injection 15 mg, 15 mg, intravenous, q6h PRN, Jayy Red DO    ketorolac (Toradol) injection 30 mg, 30 mg, intravenous, q6h PRN, Jayy Red DO    lactated Ringer's infusion, 100 mL/hr, intravenous, Continuous, Ger Huber DO, Last Rate: 100 mL/hr at 10/04/23 2139, 100 mL/hr at 10/04/23 2139    melatonin tablet 3 mg, 3 mg, oral, Daily, Ger Huber DO, 3 mg at 10/04/23 2138    ondansetron ODT (Zofran-ODT) disintegrating tablet 4 mg, 4 mg, oral, q8h PRN **OR** ondansetron (Zofran) injection 4 mg, 4 mg, intravenous, q8h PRN, Ger Huber DO, 4 mg at 10/04/23 1915    " ondansetron (Zofran) injection 4 mg, 4 mg, intravenous, Once PRN, Fili Quiles MD    oxyCODONE (Roxicodone) immediate release tablet 10 mg, 10 mg, oral, q6h PRN, Jayy Red DO, 10 mg at 10/05/23 0456    oxyCODONE (Roxicodone) immediate release tablet 5 mg, 5 mg, oral, q4h PRN, Fili Quiles MD, 5 mg at 10/04/23 2017    pantoprazole (ProtoNix) EC tablet 40 mg, 40 mg, oral, Daily before breakfast **OR** pantoprazole (ProtoNix) injection 40 mg, 40 mg, intravenous, Daily before breakfast, Ger G DO Cecile    piperacillin-tazobactam-dextrose (Zosyn) IV 3.375 g, 3.375 g, intravenous, q6h, Ger G DO Cecile, Stopped at 10/05/23 0637    vancomycin in dextrose 5 % (Vancocin) IVPB 1,000 mg, 1,000 mg, intravenous, q12h, Jerzy Masters MD, Stopped at 10/05/23 0201    LABS:  Lab Results   Component Value Date    WBC 6.6 10/05/2023    HGB 11.8 (L) 10/05/2023    HCT 36.4 10/05/2023     10/05/2023     10/05/2023     Lab Results   Component Value Date    GLUCOSE 156 (H) 10/05/2023    CALCIUM 8.8 10/05/2023     10/05/2023    K 4.0 10/05/2023    CO2 25 10/05/2023     10/05/2023    BUN 8 10/05/2023    CREATININE 0.70 10/05/2023     Results from last 72 hours   Lab Units 10/03/23  1059   ALK PHOS U/L 63   BILIRUBIN TOTAL mg/dL 1.2   PROTEIN TOTAL g/dL 7.5   ALT U/L 22   AST U/L 12   ALBUMIN g/dL 4.4     Estimated Creatinine Clearance: 125 mL/min (by C-G formula based on SCr of 0.7 mg/dL).    ASSESSMENT/PLAN:     Hydradenitis Suppurativa - chronic condition with acute flare.  Left Breast Abscess - acute  Right Groin Abscess - acute, s/p OR I/D 10/4 and culture pending     IV Vanc/Zosyn until culture data is back. Anticipating PO abx when ready for discharge.  Monitoring for adverse effects of abx such as rash/itching/diarrhea. Monitoring Vancomycin levels and renal function.     Will follow. Thanks!    Jerzy Masters MD  [facility]  Office #[phone]

## 2023-10-05 NOTE — NURSING NOTE

## 2023-10-05 NOTE — PROGRESS NOTES
"Leonardo Hardy is a 27 y.o. female on day 2 of admission presenting with Abscess of groin, right.    Subjective   No overnight events. Patient doing well. She is a febrile. Penrose in place without signs of gross bleeding or infection. +flatus. Tolerating her meals. Reports voiding well.,       Objective     Physical Exam  Constitutional:       Appearance: Normal appearance.   HENT:      Mouth/Throat:      Mouth: Mucous membranes are moist.   Cardiovascular:      Rate and Rhythm: Normal rate and regular rhythm.      Pulses: Normal pulses.      Heart sounds: Normal heart sounds.   Pulmonary:      Effort: Pulmonary effort is normal. No respiratory distress.      Breath sounds: Normal breath sounds.   Abdominal:      General: Abdomen is flat. Bowel sounds are normal.      Palpations: Abdomen is soft.      Comments: Surgical incision c/d/I with penrose in place   Skin:     General: Skin is warm and dry.   Neurological:      General: No focal deficit present.      Mental Status: She is alert and oriented to person, place, and time.   Psychiatric:         Mood and Affect: Mood normal.         Behavior: Behavior normal.         Thought Content: Thought content normal.         Judgment: Judgment normal.         Last Recorded Vitals  Blood pressure 115/68, pulse 63, temperature 36.2 °C (97.2 °F), resp. rate 18, height 1.651 m (5' 5\"), weight 98.9 kg (218 lb 0.6 oz), last menstrual period 07/17/2023, SpO2 100 %.  Intake/Output last 3 Shifts:  I/O last 3 completed shifts:  In: 720 (7.3 mL/kg) [P.O.:120; IV Piggyback:600]  Out: 7 (0.1 mL/kg) [Urine:5 (0 mL/kg/hr); Blood:2]  Dosing Weight: 98.9 kg     Relevant Results    .Scheduled medications  enoxaparin, 40 mg, subcutaneous, Daily  melatonin, 3 mg, oral, Daily  pantoprazole, 40 mg, oral, Daily before breakfast   Or  pantoprazole, 40 mg, intravenous, Daily before breakfast  piperacillin-tazobactam, 3.375 g, intravenous, q6h  vancomycin, 1,000 mg, intravenous, " q12h      Continuous medications  lactated Ringer's, 100 mL/hr, Last Rate: 100 mL/hr (10/04/23 2139)      PRN medications  PRN medications: acetaminophen **OR** acetaminophen **OR** acetaminophen, diphenhydrAMINE, HYDROmorphone, HYDROmorphone, ketorolac, ketorolac, ondansetron ODT **OR** ondansetron, ondansetron, oxyCODONE, oxyCODONE           .  Results for orders placed or performed during the hospital encounter of 10/03/23 (from the past 24 hour(s))   CBC   Result Value Ref Range    WBC 6.6 4.4 - 11.3 x10*3/uL    nRBC 0.0 0.0 - 0.0 /100 WBCs    RBC 3.64 (L) 4.00 - 5.20 x10*6/uL    Hemoglobin 11.8 (L) 12.0 - 16.0 g/dL    Hematocrit 36.4 36.0 - 46.0 %     80 - 100 fL    MCH 32.4 26.0 - 34.0 pg    MCHC 32.4 32.0 - 36.0 g/dL    RDW 13.1 11.5 - 14.5 %    Platelets 165 150 - 450 x10*3/uL    MPV 9.7 7.5 - 11.5 fL   Vancomycin, Trough   Result Value Ref Range    Vancomycin, Trough 12.1 5.0 - 20.0 ug/mL   Basic metabolic panel   Result Value Ref Range    Glucose 156 (H) 74 - 99 mg/dL    Sodium 138 136 - 145 mmol/L    Potassium 4.0 3.5 - 5.3 mmol/L    Chloride 103 98 - 107 mmol/L    Bicarbonate 25 21 - 32 mmol/L    Anion Gap 14 10 - 20 mmol/L    Urea Nitrogen 8 6 - 23 mg/dL    Creatinine 0.70 0.50 - 1.05 mg/dL    eGFR >90 >60 mL/min/1.73m*2    Calcium 8.8 8.6 - 10.3 mg/dL            Assessment/Plan   Principal Problem:    Abscess of groin, right  Active Problems:    Hidradenitis suppurativa    Left breast abscess    Patient doing well post-operatively. Maintain penrose until follow-up appointment. She will need a POV with Dr Castillo in 10-14 days. Cultures are pending. Discharge with abx per ID recommendations.    Discussed care plan with Dr Castillo.    I spent 30 minutes in the professional and overall care of this patient.      ALAINA ReynosoC

## 2023-10-05 NOTE — DISCHARGE INSTRUCTIONS
Keep incision site clean and dry. You may gently rinse over with soap and water. Keep penrose drain in place until your next follow-up appointment with Dr Castillo. Please call office number to schedule an appointment in 10-14 days.

## 2023-10-05 NOTE — SIGNIFICANT EVENT
Patient to PACU bay, anesthesia present. Patient with simple face mask in place at 6L. Patient placed on monitors. Initial assessment complete, VSS. Report received from OR team.

## 2023-10-05 NOTE — POST-PROCEDURE NOTE
Ms. Hardy is a 27 year old female who is POD #0 from an exam under anesthesia, drainage of right inner thigh abscess complex, and placement of penrose drain (Intraoperative Findings: Large amount of pus). She is feeling well post-operatively. Pain is well controlled. She denies any post-op N/V. She has urinated since surgery. Denies fevers or chills.    PE:  Constitutional: A&Ox3, calm and cooperative, NAD.  Eyes: PERRL, clear sclera.  ENMT: Moist mucous membranes.  Head/Neck: Neck supple.  Cardiovascular: Normal rate and regular rhythm.   Respiratory/Thorax: CTAB.  Genitourinary: Voiding independently without difficulty.  Musculoskeletal: ROM intact.  Neurological: A&Ox3, No focal deficits.  Psychological: Appropriate mood and behavior.  Skin: Warm and dry. ABD dressing present on right inner thigh surgical site. No shadowing on dressing. Mesh underwear in place at time of assessment.    Radiology and labs reviewed. VSS, afebrile.    Plan:   - Diet: Regular  - Can DC IVF  - Continue Zosyn and Vancomycin  - Continue current pain regimen   - PRN antiemetic   - DVT Proph: SCDs/ ambulate/ Lovenox  - Monitor VS every 4 hours  - Maintain and monitor Penrose drain   - Labs ordered for AM   - IS every hour while awake   - Encourage ambulation / OOB as tolerated   - Instructed on post operative care, s/s of infection  - Continue supportive care  - F/u with Dr. Castillo outpatient in 10-14 days once d/c from hospital     Dispo: Pain control. Monitor Penrose drain. OOB as able. Continue antibiotics.    Total time spent 25 minutes, and greater than 50% of time was spent in counseling/coordination of care

## 2023-10-07 LAB
BACTERIA SPEC CULT: ABNORMAL
BACTERIA SPEC CULT: ABNORMAL
GRAM STN SPEC: ABNORMAL
GRAM STN SPEC: ABNORMAL

## 2023-10-10 ENCOUNTER — HOSPITAL ENCOUNTER (EMERGENCY)
Facility: HOSPITAL | Age: 28
Discharge: HOME | End: 2023-10-10
Attending: EMERGENCY MEDICINE
Payer: MEDICAID

## 2023-10-10 ENCOUNTER — APPOINTMENT (OUTPATIENT)
Dept: RADIOLOGY | Facility: HOSPITAL | Age: 28
End: 2023-10-10
Payer: MEDICAID

## 2023-10-10 VITALS
DIASTOLIC BLOOD PRESSURE: 75 MMHG | BODY MASS INDEX: 36.36 KG/M2 | RESPIRATION RATE: 18 BRPM | HEART RATE: 67 BPM | SYSTOLIC BLOOD PRESSURE: 122 MMHG | TEMPERATURE: 97.7 F | HEIGHT: 65 IN | WEIGHT: 218.26 LBS | OXYGEN SATURATION: 98 %

## 2023-10-10 DIAGNOSIS — G89.18 POSTOPERATIVE PAIN: Primary | ICD-10-CM

## 2023-10-10 LAB
ALBUMIN SERPL BCP-MCNC: 3.7 G/DL (ref 3.4–5)
ALP SERPL-CCNC: 53 U/L (ref 33–110)
ALT SERPL W P-5'-P-CCNC: 21 U/L (ref 7–45)
ANION GAP SERPL CALC-SCNC: 10 MMOL/L (ref 10–20)
AST SERPL W P-5'-P-CCNC: 12 U/L (ref 9–39)
B-HCG SERPL-ACNC: <2 MIU/ML
BASOPHILS # BLD AUTO: 0.02 X10*3/UL (ref 0–0.1)
BASOPHILS NFR BLD AUTO: 0.3 %
BILIRUB SERPL-MCNC: 0.5 MG/DL (ref 0–1.2)
BUN SERPL-MCNC: 8 MG/DL (ref 6–23)
CALCIUM SERPL-MCNC: 9.3 MG/DL (ref 8.6–10.3)
CHLORIDE SERPL-SCNC: 106 MMOL/L (ref 98–107)
CO2 SERPL-SCNC: 27 MMOL/L (ref 21–32)
CREAT SERPL-MCNC: 0.77 MG/DL (ref 0.5–1.05)
EOSINOPHIL # BLD AUTO: 0.02 X10*3/UL (ref 0–0.7)
EOSINOPHIL NFR BLD AUTO: 0.3 %
ERYTHROCYTE [DISTWIDTH] IN BLOOD BY AUTOMATED COUNT: 13 % (ref 11.5–14.5)
GFR SERPL CREATININE-BSD FRML MDRD: >90 ML/MIN/1.73M*2
GLUCOSE SERPL-MCNC: 101 MG/DL (ref 74–99)
HCT VFR BLD AUTO: 37.5 % (ref 36–46)
HGB BLD-MCNC: 12.1 G/DL (ref 12–16)
IMM GRANULOCYTES # BLD AUTO: 0.06 X10*3/UL (ref 0–0.7)
IMM GRANULOCYTES NFR BLD AUTO: 1 % (ref 0–0.9)
LACTATE SERPL-SCNC: 1.8 MMOL/L (ref 0.4–2)
LYMPHOCYTES # BLD AUTO: 2.16 X10*3/UL (ref 1.2–4.8)
LYMPHOCYTES NFR BLD AUTO: 36 %
MCH RBC QN AUTO: 32.1 PG (ref 26–34)
MCHC RBC AUTO-ENTMCNC: 32.3 G/DL (ref 32–36)
MCV RBC AUTO: 100 FL (ref 80–100)
MONOCYTES # BLD AUTO: 0.28 X10*3/UL (ref 0.1–1)
MONOCYTES NFR BLD AUTO: 4.7 %
NEUTROPHILS # BLD AUTO: 3.46 X10*3/UL (ref 1.2–7.7)
NEUTROPHILS NFR BLD AUTO: 57.7 %
NRBC BLD-RTO: 0 /100 WBCS (ref 0–0)
PLATELET # BLD AUTO: 214 X10*3/UL (ref 150–450)
PMV BLD AUTO: 9.2 FL (ref 7.5–11.5)
POTASSIUM SERPL-SCNC: 3.3 MMOL/L (ref 3.5–5.3)
PROT SERPL-MCNC: 7 G/DL (ref 6.4–8.2)
RBC # BLD AUTO: 3.77 X10*6/UL (ref 4–5.2)
SODIUM SERPL-SCNC: 140 MMOL/L (ref 136–145)
WBC # BLD AUTO: 6 X10*3/UL (ref 4.4–11.3)

## 2023-10-10 PROCEDURE — 85025 COMPLETE CBC W/AUTO DIFF WBC: CPT

## 2023-10-10 PROCEDURE — 99284 EMERGENCY DEPT VISIT MOD MDM: CPT | Mod: 25

## 2023-10-10 PROCEDURE — 2500000004 HC RX 250 GENERAL PHARMACY W/ HCPCS (ALT 636 FOR OP/ED)

## 2023-10-10 PROCEDURE — 96376 TX/PRO/DX INJ SAME DRUG ADON: CPT | Mod: XU

## 2023-10-10 PROCEDURE — 99222 1ST HOSP IP/OBS MODERATE 55: CPT

## 2023-10-10 PROCEDURE — 96374 THER/PROPH/DIAG INJ IV PUSH: CPT | Mod: XU

## 2023-10-10 PROCEDURE — 84702 CHORIONIC GONADOTROPIN TEST: CPT

## 2023-10-10 PROCEDURE — 2550000001 HC RX 255 CONTRASTS

## 2023-10-10 PROCEDURE — 83605 ASSAY OF LACTIC ACID: CPT

## 2023-10-10 PROCEDURE — 80053 COMPREHEN METABOLIC PANEL: CPT

## 2023-10-10 PROCEDURE — 36415 COLL VENOUS BLD VENIPUNCTURE: CPT

## 2023-10-10 PROCEDURE — 72193 CT PELVIS W/DYE: CPT

## 2023-10-10 PROCEDURE — 72193 CT PELVIS W/DYE: CPT | Mod: FOREIGN READ | Performed by: RADIOLOGY

## 2023-10-10 RX ORDER — MORPHINE SULFATE 4 MG/ML
4 INJECTION, SOLUTION INTRAMUSCULAR; INTRAVENOUS ONCE
Status: COMPLETED | OUTPATIENT
Start: 2023-10-10 | End: 2023-10-10

## 2023-10-10 RX ADMIN — MORPHINE SULFATE 4 MG: 4 INJECTION, SOLUTION INTRAMUSCULAR; INTRAVENOUS at 10:54

## 2023-10-10 RX ADMIN — MORPHINE SULFATE 4 MG: 4 INJECTION, SOLUTION INTRAMUSCULAR; INTRAVENOUS at 16:01

## 2023-10-10 RX ADMIN — IOHEXOL 75 ML: 350 INJECTION, SOLUTION INTRAVENOUS at 11:59

## 2023-10-10 ASSESSMENT — ENCOUNTER SYMPTOMS
PSYCHIATRIC NEGATIVE: 1
RESPIRATORY NEGATIVE: 1
EYES NEGATIVE: 1
ALLERGIC/IMMUNOLOGIC NEGATIVE: 1
ENDOCRINE NEGATIVE: 1
ABDOMINAL DISTENTION: 0
CARDIOVASCULAR NEGATIVE: 1
CHILLS: 0
DIARRHEA: 0
ABDOMINAL PAIN: 0
FEVER: 0

## 2023-10-10 ASSESSMENT — PAIN SCALES - GENERAL
PAINLEVEL_OUTOF10: 10 - WORST POSSIBLE PAIN
PAINLEVEL_OUTOF10: 9

## 2023-10-10 ASSESSMENT — COLUMBIA-SUICIDE SEVERITY RATING SCALE - C-SSRS
2. HAVE YOU ACTUALLY HAD ANY THOUGHTS OF KILLING YOURSELF?: NO
1. IN THE PAST MONTH, HAVE YOU WISHED YOU WERE DEAD OR WISHED YOU COULD GO TO SLEEP AND NOT WAKE UP?: NO
6. HAVE YOU EVER DONE ANYTHING, STARTED TO DO ANYTHING, OR PREPARED TO DO ANYTHING TO END YOUR LIFE?: NO

## 2023-10-10 ASSESSMENT — PAIN - FUNCTIONAL ASSESSMENT: PAIN_FUNCTIONAL_ASSESSMENT: 0-10

## 2023-10-10 ASSESSMENT — PAIN DESCRIPTION - PAIN TYPE: TYPE: SURGICAL PAIN

## 2023-10-10 NOTE — CONSULTS
Inpatient consult to Acute Care Surgery  Consult performed by: Christofer Olivera PA-C  Consult ordered by: Venus Paz PA-C  Reason for consult: drainage site pain/bleeding          Reason For Consult  Drainage site pain/bleeding    History Of Present Illness  Leonardo Hardy is a 27 y.o. female who is s/p right thigh abscess drainage with Dr Castillo on 10/4/23. She was discharged with doxycyline, Augmentin, oxycodone. She was feeling well post-operatively when around Sunday she began experiencing increased pain at the site of surgery around Sunday. She has a penrose in place which she says has been discharging scant amounts of blood and pus. She denied fevers, chills, chest pain, shortness of breath, abdominal pain, n/v. Surgery was consulted for the management of her post-op pain.     Past Medical History  She has a past medical history of Asthma, Colon cancer (CMS/[facility]), and Hidradenitis suppurativa.    Surgical History  She has a past surgical history that includes CT angio neck w and wo IV contrast (12/26/2020).     Social History  She reports that she has never smoked. She has never used smokeless tobacco. She reports current alcohol use of about 1.0 standard drink of alcohol per week. She reports current drug use. Frequency: 1.00 time per week. Drug: Marijuana.    Family History  No family history on file.     Allergies  Azithromycin; Erythromycin; Fish containing products; Sulfa (sulfonamide antibiotics); Grass pollen-bermuda, standard; and Bee pollen    Review of Systems   Constitutional:  Negative for chills and fever.   HENT: Negative.     Eyes: Negative.    Respiratory: Negative.     Cardiovascular: Negative.    Gastrointestinal:  Negative for abdominal distention, abdominal pain and diarrhea.   Endocrine: Negative.    Genitourinary: Negative.    Musculoskeletal:         Pain along prior incision site   Allergic/Immunologic: Negative.    Psychiatric/Behavioral: Negative.     All other systems reviewed  "and are negative.       Physical Exam  Constitutional:       General: She is not in acute distress.     Appearance: Normal appearance.   Eyes:      Extraocular Movements: Extraocular movements intact.      Pupils: Pupils are equal, round, and reactive to light.   Cardiovascular:      Rate and Rhythm: Normal rate and regular rhythm.      Pulses: Normal pulses.      Heart sounds: Normal heart sounds.   Pulmonary:      Effort: Pulmonary effort is normal. No respiratory distress.      Breath sounds: Normal breath sounds.   Abdominal:      General: Abdomen is flat. Bowel sounds are normal.      Palpations: Abdomen is soft.   Musculoskeletal:      Comments: Penrose in place without any signs of discharge or gross bleeding. No warmth or induration seen   Skin:     General: Skin is warm and dry.      Capillary Refill: Capillary refill takes less than 2 seconds.   Neurological:      General: No focal deficit present.      Mental Status: She is alert and oriented to person, place, and time.   Psychiatric:         Mood and Affect: Mood normal.         Behavior: Behavior normal.         Thought Content: Thought content normal.         Judgment: Judgment normal.          Last Recorded Vitals  Blood pressure 125/78, pulse 61, temperature 36.5 °C (97.7 °F), temperature source Tympanic, resp. rate 18, height 1.651 m (5' 5\"), weight 99 kg (218 lb 4.1 oz), last menstrual period 07/17/2023, SpO2 99 %.    Relevant Results  .  Results for orders placed or performed during the hospital encounter of 10/10/23 (from the past 24 hour(s))   CBC and Auto Differential   Result Value Ref Range    WBC 6.0 4.4 - 11.3 x10*3/uL    nRBC 0.0 0.0 - 0.0 /100 WBCs    RBC 3.77 (L) 4.00 - 5.20 x10*6/uL    Hemoglobin 12.1 12.0 - 16.0 g/dL    Hematocrit 37.5 36.0 - 46.0 %     80 - 100 fL    MCH 32.1 26.0 - 34.0 pg    MCHC 32.3 32.0 - 36.0 g/dL    RDW 13.0 11.5 - 14.5 %    Platelets 214 150 - 450 x10*3/uL    MPV 9.2 7.5 - 11.5 fL    Neutrophils % 57.7 " 40.0 - 80.0 %    Immature Granulocytes %, Automated 1.0 (H) 0.0 - 0.9 %    Lymphocytes % 36.0 13.0 - 44.0 %    Monocytes % 4.7 2.0 - 10.0 %    Eosinophils % 0.3 0.0 - 6.0 %    Basophils % 0.3 0.0 - 2.0 %    Neutrophils Absolute 3.46 1.20 - 7.70 x10*3/uL    Immature Granulocytes Absolute, Automated 0.06 0.00 - 0.70 x10*3/uL    Lymphocytes Absolute 2.16 1.20 - 4.80 x10*3/uL    Monocytes Absolute 0.28 0.10 - 1.00 x10*3/uL    Eosinophils Absolute 0.02 0.00 - 0.70 x10*3/uL    Basophils Absolute 0.02 0.00 - 0.10 x10*3/uL   Comprehensive metabolic panel   Result Value Ref Range    Glucose 101 (H) 74 - 99 mg/dL    Sodium 140 136 - 145 mmol/L    Potassium 3.3 (L) 3.5 - 5.3 mmol/L    Chloride 106 98 - 107 mmol/L    Bicarbonate 27 21 - 32 mmol/L    Anion Gap 10 10 - 20 mmol/L    Urea Nitrogen 8 6 - 23 mg/dL    Creatinine 0.77 0.50 - 1.05 mg/dL    eGFR >90 >60 mL/min/1.73m*2    Calcium 9.3 8.6 - 10.3 mg/dL    Albumin 3.7 3.4 - 5.0 g/dL    Alkaline Phosphatase 53 33 - 110 U/L    Total Protein 7.0 6.4 - 8.2 g/dL    AST 12 9 - 39 U/L    Bilirubin, Total 0.5 0.0 - 1.2 mg/dL    ALT 21 7 - 45 U/L   Human Chorionic Gonadotropin, Serum Quantitative   Result Value Ref Range    HCG, Beta-Quantitative <2 <5 mIU/mL   Lactate   Result Value Ref Range    Lactate 1.8 0.4 - 2.0 mmol/L     .CT pelvis w IV contrast    Result Date: 10/10/2023  STUDY: CT PELVIS with CONTRAST; 10/10/2023 12:17 PM. INDICATION:  Right groin abscess with worsening pain and swelling. TECHNIQUE:  CT of the pelvis was performed with intravenous contrast. Omnipaque 350, 75 mL was administered intravenously.  Automated mA/kV exposure control was utilized and patient examination was performed in strict accordance with principles of ALARA. RADIATION AMOUNT:  823.25 mGy-cm. COMPARISON:  CT abdomen pelvis 10/3/2023. FINDINGS: Pelvis: The recent prior study demonstrated two abscesses with surrounding inflammatory changes in the subcutaneous fat of the proximal medial right  thigh.  There appears to have been interval surgery with placement of a drain in this region.  There are persistent inflammatory changes in the subcutaneous fat and there is associated skin thickening.  There is no longer a discrete drainable fluid collection identified.  There is a tiny quantity of gas in the soft tissues which is nonspecific with a drain in place.  No new abscess is identified. There appears to have been a prior right hemicolectomy.  No bowel obstruction.  Mild prominence of stool in the colon.  There is no bowel wall thickening.  No evidence of free fluid or free air. Bilateral inguinal lymph nodes are mildly prominent, but unchanged. These are likely reactive.  The uterus is normal in size.  No evidence of an abnormal adnexal mass.  There are metallic densities in the soft tissues of the pelvis which potentially could be displaced tubal ligation clips.  I am unable to evaluate for the adequacy of tubal sterilization on the basis of this imaging.  Urinary bladder is unremarkable.  Skeleton: There are no acute fractures.  No suspicious bony lesions.    The recent prior study demonstrated small abscesses with surrounding inflammatory changes in the subcutaneous fat of the proximal medial right thigh, near the labia on the right.  There appears to have been interval surgery with placement of a drain in this region.  There are persistent inflammatory changes in the subcutaneous fat and there is associated skin thickening.  There is no longer a discrete drainable fluid collection identified.  There is a tiny quantity of gas in the soft tissues which is nonspecific with a drain in place.  No new abscess is identified. Mild constipation.  No evidence of bowel obstruction.  No evidence of free fluid or free air in the abdomen/pelvis. Signed by Mervin Jackson MD    CT abdomen pelvis wo IV contrast    Result Date: 10/3/2023  Interpreted By:  Philipp Yanez, STUDY: CT ABDOMEN PELVIS WO IV CONTRAST;  10/3/2023  2:35 pm   INDICATION: Signs/Symptoms:abscess, first scan not low enough. Only need pelvis through midthigh.   COMPARISON: The exam was done immediately after a CT scan of the abdomen and pelvis with contrast earlier today at 1:30 p.m.. The images from that exam apparently did not include the area of interest which was the medial upper right..   ACCESSION NUMBER(S): [id number]   ORDERING CLINICIAN: DEAN PANDEY   TECHNIQUE: Repeat axial images were obtained from the xiphoid down through the mid thighs.   FINDINGS: In the area of clinical concern which was the proximal medial right thigh, there was an area medial skin thickening and edema of the subcutaneous fat with a small abscess in the medial subcutaneous fat measuring 23 x 20 mm, on axial image 178/200. There is mild thickening the fascia overlying the musculature of the medial aspect of the proximal to mid right thigh. Just cephalad and posterior to this abscess is a 2nd smaller abscess that measures 31 x 12  mm, on axial image 168/200. This particular abscess is at the posterior base of the right labia majora.   For additional information, please see the report just dictated for the 1:30 p.m. exam.       There are 2 abscesses in the subcutaneous fat of the proximal to mid medial right thigh as described. Please see above for details.   MACRO: None   Signed by: Philipp Yanez 10/3/2023 2:49 PM Dictation workstation:   [id number]    CT abdomen pelvis w IV contrast    Result Date: 10/3/2023  Interpreted By:  Philipp Yanez, STUDY: CT ABDOMEN PELVIS W IV CONTRAST;  10/3/2023 1:33 pm   INDICATION: 26 y/o   F with  Signs/Symptoms:groin abscess.   LIMITATIONS: None.   ACCESSION NUMBER(S): [id number]   ORDERING CLINICIAN: DEAN PANDEY   TECHNIQUE: After the administration of   oral water and IV nonionic contrast, spiral axial images were obtained from the xiphoid down through the symphysis pubis. Sagittal and coronal reconstruction images were generated. Bone,  mediastinal, lung, and liver windows were reviewed. IV contrast agent was Omnipaque 350, 75 mL.   COMPARISON: Previous exam is from 10/17/2022..   FINDINGS: LUNG BASES: No mass or pneumonia or pleural effusion in either lung base..   LIVER: Hepatomegaly, with the liver measuring 19.8 cm on the right Diffusely decreased liver density.. No  liver lesion evident in this  exam.   GALLBLADDER: No calcified stone, gallbladder wall thickening, or adjacent edema.   BILE DUCTS: No intrahepatic biliary ductal dilatation.  Common bile duct was within the limits of normal.   SPLEEN: No  splenomegaly. No splenic mass..   PANCREAS: No pancreatic mass or inflammation, or ductal dilatation.   KIDNEYS/ADRENALS: No adrenal mass or enlargement. No calcified stone, hydronephrosis, mass, or perinephric edema in either kidney. No ureteral stone or dilatation.   BLADDER/PELVIS: Urinary bladder was grossly intact. No uterine enlargement or gross adnexal mass. There is a tubal ligation clip in the posteroinferior right pelvis OA from the right adnexa, and there is a displaced tubal ligation clip along the anterior surface of the mid abdominal peritoneum on axial image 106, just below the umbilicus.   GREAT VESSELS/RETROPERITONEUM: Abdominal aorta and IVC were intact. No suspicious retroperitoneal adenopathy. No suspicious mesenteric adenopathy. There is mild bilateral inguinal adenopathy, right greater than left. The largest lymph node on the right measures 15 mm AP today, compared to 11 mm previously. There is a right common femoral chain lymph node measuring 21 x 8 mm, previously measuring 15 x 7 mm. No other pelvic adenopathy..   PERITONEUM: No ascites. No pneumoperitoneum. No peritoneal or mesenteric mass or inflammation.   BOWEL: The stomach was  grossly intact. There was no small bowel dilatation or small bowel wall thickening. No small-bowel obstruction. Previous partial right colectomy with reanastomosis.   BONES: No destructive  lytic or blastic bone lesion.   ABDOMINAL WALL: Unremarkable. There is no abscess in either imaged inguinal region. There was no skin thickening or stranding of the subcutaneous fat.       Previous partial right colectomy with reanastomosis.   Stable displaced tubal ligation clip from the right, remaining present in the right anterior midline pelvic mesenteric fat. Interval displacement of previous left tubal ligation clip.   Mild nonspecific bilateral inguinal adenopathy, left greater than right, mildly progressed on the right. This could be infectious/inflammatory adenopathy. There is also enlargement of a right common femoral chain lymph node. There is however no associated fluid collection or edema in the imaged portion of the right groin.   Mild hepatomegaly.   MACRO: None   Signed by: Philipp Yanez 10/3/2023 2:19 PM Dictation workstation:   [id number]       Assessment/Plan     Leonardo Hardy is a 27 y.o. female who is s/p right thigh abscess drainage with Dr Castillo on 10/4/23. She was discharged with doxycyline, Augmentin, oxycodone. She was feeling well post-operatively when around Sunday she began experiencing increased pain at the site of surgery around Sunday. She has a penrose in place which she says has been discharging scant amounts of blood and pus. She denied fevers, chills, chest pain, shortness of breath, abdominal pain, n/v. Surgery was consulted for the management of her post-op pain.    Her labs were reviewed. Her physical exam is unremarkable. CT scan was reviewed.    Plan:  - Penrose was discontinued  - Continue abx as prescribed from prior surgery  - Keep POV visit with Dr Castillo  - Was told to monitor site for increased erythema, swelling, increase in blood/drainage, increase in pain.  - Supportive care    Discussed care plan with Dr Castillo. She may be discharged once cleared medically. POV with Dr Castillo next week.      I spent 60 minutes in the professional and overall care of this  patient.

## 2023-10-10 NOTE — ED NOTES
CHW talked to patient regarding not having a primary physician. Patient expressed that she does have a primary physician from [facility] and his name is Dr Chou. Patient still has [facility]. Patient was appreciated.     DALI Velasquez  10/10/23 [id number]

## 2023-10-10 NOTE — ED TRIAGE NOTES
Pt to ed from home by pcp for complications with right inner thigh drain s/p abscess drain placement 10/4/23. Pain, and bloody drainage

## 2023-10-10 NOTE — ED PROVIDER NOTES
EMERGENCY MEDICINE EVALUATION NOTE    History of Present Illness     Chief Complaint:   Chief Complaint   Patient presents with    Drainage from Incision       HPI: Leonardo Hardy is a 27 y.o. female presents with a chief complaint of worsening pain, bleeding, and drainage from an abscess on the right groin.  Patient presented to the ED about 1 week ago on 10/3 with a right groin abscess, was admitted and underwent incision and drainage with surgery on 10/4.  Penrose drain was placed.  She was subsequently discharged on doxycycline, amoxicillin, and oxycodone for pain.  She states that yesterday her pain significantly increased, and she began having bleeding from the drain site.  She states that the blood was dripping down her thigh at one point yesterday.  She states that prior to yesterday she was healing as expected.  She has not had any fevers or chills.  Has no abdominal pain, no nausea or vomiting, no constipation or diarrhea.  No urinary symptoms.  She reports compliance with her antibiotics.  No other complaints.    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, hidradenitis suppurativa on chronic suppressive therapy with doxycycline   Family history: noncontributory  Social history: +smoker, no ETOH, no illicit substances         Previous History     Past Medical History:   Diagnosis Date    Asthma     Colon cancer (CMS/HCC)     Hidradenitis suppurativa      Past Surgical History:   Procedure Laterality Date    CT NECK ANGIO W AND WO IV CONTRAST  12/26/2020    CT NECK ANGIO W AND WO IV CONTRAST 12/26/2020     Social History     Tobacco Use    Smoking status: Never    Smokeless tobacco: Never    Tobacco comments:     Canibus occasionally   Substance Use Topics    Alcohol use: Yes     Alcohol/week: 1.0 standard drink of alcohol     Types: 1 Glasses of wine per week     Comment: occassional    Drug use: Yes     Frequency: 1.0 times per week     Types: Marijuana     No family history on  file.  Allergies   Allergen Reactions    Azithromycin Anaphylaxis    Erythromycin Shortness of breath, Hives and Rash     ? rash as infant    Throat swells    Fish Containing Products Anaphylaxis and Hives    Sulfa (Sulfonamide Antibiotics) Hives, Anaphylaxis, Shortness of breath and Rash     Throat swells    Grass Pollen-[place], Standard Hives    Bee Pollen Hives     Current Outpatient Medications   Medication Instructions    amoxicillin-pot clavulanate (Augmentin) 875-125 mg tablet 875 mg, oral, 2 times daily    doxycycline (VIBRAMYCIN) 100 mg, oral, 2 times daily, Take with at least 8 ounces (large glass) of water, do not lie down for 30 minutes after    oxyCODONE (ROXICODONE) 5 mg, oral, Every 6 hours PRN    valACYclovir (VALTREX) 500 mg, oral, 2 times daily PRN, Take for 5 days when flares show up       Physical Exam     Appearance: Alert, oriented , cooperative,  in no acute distress. Well nourished & well hydrated.     Skin: +abscess in the right groin, penrose drain in place, there is a small amount of bleeding from the more superior entry point of the drain, abscess is mildly fluctuant and exquisitely tender. Does not extend into the thigh.  No significant overlying erythema or warmth. Skin otherwise warm and dry.     Eyes: PERRLA, EOMs intact,  Conjunctiva pink with no redness or exudates.      ENT: Hearing grossly intact. Pharynx clear, uvula midline.      Neck: Supple.      Pulmonary: Clear bilaterally. No accessory muscle use or stridor.     Cardiac: Sinus rhythm. No JVD.     Abdomen: Soft, nontender, active bowel sounds.  No palpable organomegaly.  No rebound or guarding.     Musculoskeletal: Full range of motion. no pain, edema, or deformity.     Neurological:  Cranial nerves II through XII are grossly intact, no focal findings identified.     Psychiatric: Appropriate mood and affect.      Results     Labs Reviewed   CBC WITH AUTO DIFFERENTIAL   COMPREHENSIVE METABOLIC PANEL   HUMAN CHORIONIC  "GONADOTROPIN, SERUM QUANTITATIVE   LACTATE     CT pelvis w IV contrast    (Results Pending)         ED Course & Medical Decision Making     Medications   morphine injection 4 mg (has no administration in time range)     Heart Rate:  [73]   Temp:  [36.5 °C (97.7 °F)]   Resp:  [15]   BP: (140)/(80)   Height:  [165.1 cm (5' 5\")]   Weight:  [99 kg (218 lb 4.1 oz)]   SpO2:  [100 %]    Diagnoses as of 10/10/23 1649   Postoperative pain     ED course / MDM     Summary:  Patient presented with increased pain and bleeding from drain site that was placed in a groin abscess about 6 days ago.  No fever or chills, no other symptoms.  Vital signs stable, patient appears nontoxic.  On exam, Penrose drain is in place, there is a small amount of bleeding, abscess is present and is minimally fluctuant, there is no purulent drainage noted.  Abdomen soft nontender.  Labs are reassuring, no leukocytosis, minor electrolyte abnormalities, normal kidney and liver function.  CT scan shows similar findings to prior with no focal fluid collection.  Discussed with surgical NASIR, who consulted with Dr. Castillo and evaluated the patient in the ED.  Eventually removed Penrose drain, and recommend discharge with continuation of her prescribed pain medications and antibiotic regimen. Patient case discussed with ED attending Dr. Feliciano, who also saw and evaluated the patient. Results and differential were discussed in detail with the patient. Patient was given strict return precautions, understands reasons to return to the ED. Also discussed supportive care instructions. I expressed the importance of outpatient follow up with their PCP. All questions were answered, patient expressed understanding and stated that they would comply.    Patient was advised to follow up with PCP or recommended provider in 2-3 days for another evaluation and exam. I advised patient and family/friend/caregiver/guardian to return or go to closest emergency room immediately if " symptoms change, get worse, new symptoms develop prior to follow up. If there is no improvement in symptoms in the next 24 hours they are advised to return for further evaluation and exam. I also explained the plan and treatment course. Patient and family/friend/caregiver/guardian is in agreement with plan, treatment course, and follow up and states verbally that they will comply.    Impression:  1. See diagnosis    Plan: Homegoing. I discussed the differential, results, and discharge plan with the patient and family/friend/caregiver. I emphasized the importance of follow-up with the physician I referred them to in the timeframe recommended.  I explained reasons for the patient to return to the Emergency Department. They agreed that if they feel their condition is worsening or if they have any other concern they should call 911 immediately for further assistance. We also discussed medications that were prescribed including common side effects and interactions. The patient was advised to abstain from driving, operating heavy machinery, or making significant decisions while taking medications such as opiates and muscle relaxers that may impair this. I gave the patient an opportunity to ask all questions they had and answered all of them accordingly. They understand return precautions and discharge instructions. The patient and family/friend/caregiver expressed understanding verbally and that they would comply.       Disposition: Discharge    Patient seen and discussed with Dr. Feliciano    This note has been transcribed using voice recognition and may contain grammatical errors, misplaced words, incorrect words, incorrect phrases or other errors.     Procedures   Procedures    Diagnosis   No diagnosis found.    Disposition   Discharge    ED Prescriptions    None          Venus Paz PA-C  10/10/23 1770

## 2023-10-18 PROBLEM — A59.01 TRICHOMONAL VAGINITIS DURING PREGNANCY IN FIRST TRIMESTER (HHS-HCC): Status: ACTIVE | Noted: 2018-10-25

## 2023-10-18 PROBLEM — O99.213 OBESITY AFFECTING PREGNANCY IN THIRD TRIMESTER (HHS-HCC): Status: ACTIVE | Noted: 2019-03-04

## 2023-10-18 PROBLEM — F12.10 MILD CANNABIS USE DISORDER: Status: ACTIVE | Noted: 2020-10-23

## 2023-10-18 PROBLEM — O43.122: Status: ACTIVE | Noted: 2019-02-05

## 2023-10-18 PROBLEM — N61.1 ABSCESS OF BREAST: Status: ACTIVE | Noted: 2023-10-18

## 2023-10-18 PROBLEM — J20.9 BRONCHITIS WITH BRONCHOSPASM: Status: ACTIVE | Noted: 2023-10-18

## 2023-10-18 PROBLEM — F33.0 MAJOR DEPRESSIVE DISORDER, RECURRENT EPISODE, MILD (CMS-HCC): Chronic | Status: ACTIVE | Noted: 2021-11-23

## 2023-10-18 PROBLEM — B34.9 VIRAL DISEASE: Status: ACTIVE | Noted: 2023-10-18

## 2023-10-18 PROBLEM — O99.013 ANEMIA AFFECTING PREGNANCY IN THIRD TRIMESTER (HHS-HCC): Status: ACTIVE | Noted: 2019-04-08

## 2023-10-18 PROBLEM — O23.591 TRICHOMONAL VAGINITIS DURING PREGNANCY IN FIRST TRIMESTER (HHS-HCC): Status: ACTIVE | Noted: 2018-10-25

## 2023-10-18 PROBLEM — R87.610 ASCUS OF CERVIX WITH NEGATIVE HIGH RISK HPV: Status: ACTIVE | Noted: 2021-10-20

## 2023-10-18 RX ORDER — ONDANSETRON 4 MG/1
1 TABLET, ORALLY DISINTEGRATING ORAL 3 TIMES DAILY PRN
COMMUNITY
Start: 2020-03-21 | End: 2024-05-27

## 2023-10-18 RX ORDER — KETOCONAZOLE 20 MG/G
CREAM TOPICAL 2 TIMES DAILY
COMMUNITY
Start: 2023-08-03

## 2023-10-18 RX ORDER — FLUTICASONE PROPIONATE 50 MCG
2 SPRAY, SUSPENSION (ML) NASAL DAILY
COMMUNITY

## 2023-10-18 RX ORDER — TRIAMCINOLONE ACETONIDE 1 MG/G
OINTMENT TOPICAL EVERY OTHER DAY
COMMUNITY
Start: 2023-08-03

## 2023-10-18 RX ORDER — NAPROXEN 500 MG/1
1 TABLET ORAL 2 TIMES DAILY
COMMUNITY
Start: 2023-04-03 | End: 2024-05-27

## 2023-10-18 RX ORDER — DICYCLOMINE HYDROCHLORIDE 10 MG/1
1 CAPSULE ORAL 3 TIMES DAILY PRN
COMMUNITY
Start: 2022-10-17

## 2023-10-18 RX ORDER — HYDROCORTISONE ACETATE 25 MG/1
SUPPOSITORY RECTAL
COMMUNITY
Start: 2022-10-31

## 2023-10-18 RX ORDER — IBUPROFEN 600 MG/1
1 TABLET ORAL 3 TIMES DAILY PRN
COMMUNITY
Start: 2017-12-21 | End: 2024-05-27

## 2023-10-18 RX ORDER — LEVONORGESTREL AND ETHINYL ESTRADIOL 0.15-0.03
1 KIT ORAL DAILY
COMMUNITY
Start: 2023-09-19

## 2023-10-18 RX ORDER — HYDROCORTISONE 25 MG/G
CREAM TOPICAL
COMMUNITY
Start: 2022-10-31

## 2023-10-18 RX ORDER — CETIRIZINE HYDROCHLORIDE 10 MG/1
1 TABLET ORAL DAILY
COMMUNITY
Start: 2017-12-21

## 2023-10-18 RX ORDER — ALBUTEROL SULFATE 90 UG/1
2 AEROSOL, METERED RESPIRATORY (INHALATION) EVERY 4 HOURS PRN
COMMUNITY
Start: 2017-11-25

## 2023-10-18 RX ORDER — NYSTATIN 100000 [USP'U]/G
POWDER TOPICAL 2 TIMES DAILY
COMMUNITY
Start: 2023-05-03

## 2023-10-18 RX ORDER — ACETAMINOPHEN 500 MG
500 TABLET ORAL EVERY 8 HOURS PRN
COMMUNITY
End: 2024-05-27

## 2023-10-18 RX ORDER — CLINDAMYCIN PHOSPHATE 10 MG/G
GEL TOPICAL 2 TIMES DAILY
COMMUNITY
Start: 2022-12-14

## 2023-10-18 RX ORDER — PANTOPRAZOLE SODIUM 40 MG/1
1 TABLET, DELAYED RELEASE ORAL DAILY
COMMUNITY
Start: 2022-10-06

## 2023-10-18 RX ORDER — KETOCONAZOLE 20 MG/ML
SHAMPOO, SUSPENSION TOPICAL
COMMUNITY
Start: 2023-08-03

## 2023-10-18 RX ORDER — CLOBETASOL PROPIONATE 0.5 MG/G
OINTMENT TOPICAL
COMMUNITY
Start: 2022-06-18

## 2023-10-18 RX ORDER — IBUPROFEN 800 MG/1
1 TABLET ORAL EVERY 6 HOURS PRN
COMMUNITY
Start: 2023-05-30 | End: 2024-05-27

## 2023-10-18 RX ORDER — DOCUSATE SODIUM 100 MG/1
1 CAPSULE, LIQUID FILLED ORAL DAILY
COMMUNITY
Start: 2023-05-30

## 2023-10-18 RX ORDER — BENZOYL PEROXIDE 100 MG/ML
LIQUID TOPICAL DAILY
COMMUNITY
Start: 2022-12-14

## 2023-10-18 RX ORDER — MEDROXYPROGESTERONE ACETATE 150 MG/ML
150 INJECTION, SUSPENSION INTRAMUSCULAR ONCE
COMMUNITY

## 2023-10-18 RX ORDER — CYCLOBENZAPRINE HCL 10 MG
1 TABLET ORAL 3 TIMES DAILY PRN
COMMUNITY
Start: 2018-01-20 | End: 2024-05-27

## 2023-10-19 ENCOUNTER — OFFICE VISIT (OUTPATIENT)
Dept: SURGERY | Facility: CLINIC | Age: 28
End: 2023-10-19
Payer: MEDICAID

## 2023-10-19 VITALS
WEIGHT: 221 LBS | HEART RATE: 73 BPM | SYSTOLIC BLOOD PRESSURE: 125 MMHG | BODY MASS INDEX: 35.52 KG/M2 | DIASTOLIC BLOOD PRESSURE: 80 MMHG | HEIGHT: 66 IN | OXYGEN SATURATION: 100 % | TEMPERATURE: 97.4 F

## 2023-10-19 DIAGNOSIS — L02.214 ABSCESS OF GROIN, RIGHT: Primary | ICD-10-CM

## 2023-10-19 PROCEDURE — 99212 OFFICE O/P EST SF 10 MIN: CPT | Performed by: SURGERY

## 2023-10-19 PROCEDURE — 1036F TOBACCO NON-USER: CPT | Performed by: SURGERY

## 2023-10-19 PROCEDURE — 3008F BODY MASS INDEX DOCD: CPT | Performed by: SURGERY

## 2023-10-19 ASSESSMENT — PAIN SCALES - GENERAL: PAINLEVEL: 0-NO PAIN

## 2023-10-19 NOTE — PROGRESS NOTES
"Leonardo Hardy is a 27 y.o. female on day 0 of admission presenting with No Principal Problem: There is no principal problem currently on the Problem List. Please update the Problem List and refresh..    Assessment/Plan   Recovering well following recent drainage of a right groin/labial abscess.  She is complete her course of antibiotics.  Can follow-up with me as needed    Subjective   Ms. Hardy is following up after groin abscess drainage.  Her Penrose was taken out last week.  She has 1 more day of antibiotics.  Overall she feels much better       Objective     Physical Exam  NAD  A&Ox3  Non icteric  CTA  RR  Abdomen soft  right groin I&D site nearly healed.  Resolution of previously seen   Induration and erythema  Extremities warm, well perfused     Last Recorded Vitals  Blood pressure 125/80, pulse 73, temperature 36.3 °C (97.4 °F), temperature source Temporal, height 1.676 m (5' 6\"), weight 100 kg (221 lb), last menstrual period 07/17/2023, SpO2 100 %.  Intake/Output last 3 Shifts:  No intake/output data recorded.    Relevant Results    Scheduled medications    Continuous medications    PRN medications    m Stain 4+ GRANULOCYTES. 1+ GRAM (+) COCCI   Tissue/Wound Culture/Smear 4+ANAEROBIC MIXED BACTERIA Abnormal    Tissue/Wound Culture/Smear Enterococcus faecalis Abnormal    Comment: COLONIES-6~NO HIGH LEVEL GENTAMICIN RESISTANCE DETECTED   Resulting Agency [facility]        Susceptibility     Enterococcus faecalis     GRAM POS CORONA SUSC    $$ Ampicillin Susceptible     Gentamicin Synergy Susceptible     Penicillin Susceptible    $ Vancomycin Susceptible                             I spent 25 minutes in the professional and overall care of this patient.      Ibrahima Castillo MD      "

## 2023-12-29 ENCOUNTER — APPOINTMENT (OUTPATIENT)
Dept: RADIOLOGY | Facility: HOSPITAL | Age: 28
End: 2023-12-29
Payer: MEDICAID

## 2023-12-29 ENCOUNTER — HOSPITAL ENCOUNTER (EMERGENCY)
Facility: HOSPITAL | Age: 28
Discharge: HOME | End: 2023-12-29
Payer: MEDICAID

## 2023-12-29 VITALS
TEMPERATURE: 98.4 F | SYSTOLIC BLOOD PRESSURE: 125 MMHG | DIASTOLIC BLOOD PRESSURE: 91 MMHG | BODY MASS INDEX: 37.06 KG/M2 | HEIGHT: 65 IN | HEART RATE: 89 BPM | RESPIRATION RATE: 16 BRPM | WEIGHT: 222.44 LBS | OXYGEN SATURATION: 99 %

## 2023-12-29 DIAGNOSIS — J45.20 INTERMITTENT ASTHMA WITHOUT COMPLICATION, UNSPECIFIED ASTHMA SEVERITY (HHS-HCC): ICD-10-CM

## 2023-12-29 DIAGNOSIS — U07.1 COVID: Primary | ICD-10-CM

## 2023-12-29 LAB
ALBUMIN SERPL BCP-MCNC: 4.2 G/DL (ref 3.4–5)
ALP SERPL-CCNC: 56 U/L (ref 33–110)
ALT SERPL W P-5'-P-CCNC: 21 U/L (ref 7–45)
ANION GAP SERPL CALC-SCNC: 12 MMOL/L (ref 10–20)
AST SERPL W P-5'-P-CCNC: 16 U/L (ref 9–39)
BASOPHILS # BLD AUTO: 0.01 X10*3/UL (ref 0–0.1)
BASOPHILS NFR BLD AUTO: 0.3 %
BILIRUB SERPL-MCNC: 1 MG/DL (ref 0–1.2)
BUN SERPL-MCNC: 6 MG/DL (ref 6–23)
CALCIUM SERPL-MCNC: 9.2 MG/DL (ref 8.6–10.3)
CHLORIDE SERPL-SCNC: 102 MMOL/L (ref 98–107)
CO2 SERPL-SCNC: 25 MMOL/L (ref 21–32)
CREAT SERPL-MCNC: 0.86 MG/DL (ref 0.5–1.05)
EOSINOPHIL # BLD AUTO: 0.01 X10*3/UL (ref 0–0.7)
EOSINOPHIL NFR BLD AUTO: 0.3 %
ERYTHROCYTE [DISTWIDTH] IN BLOOD BY AUTOMATED COUNT: 12.9 % (ref 11.5–14.5)
FLUAV RNA RESP QL NAA+PROBE: NOT DETECTED
FLUBV RNA RESP QL NAA+PROBE: NOT DETECTED
GFR SERPL CREATININE-BSD FRML MDRD: >90 ML/MIN/1.73M*2
GLUCOSE SERPL-MCNC: 100 MG/DL (ref 74–99)
HCT VFR BLD AUTO: 40.2 % (ref 36–46)
HGB BLD-MCNC: 13.4 G/DL (ref 12–16)
IMM GRANULOCYTES # BLD AUTO: 0.01 X10*3/UL (ref 0–0.7)
IMM GRANULOCYTES NFR BLD AUTO: 0.3 % (ref 0–0.9)
LYMPHOCYTES # BLD AUTO: 0.73 X10*3/UL (ref 1.2–4.8)
LYMPHOCYTES NFR BLD AUTO: 21 %
MCH RBC QN AUTO: 32.4 PG (ref 26–34)
MCHC RBC AUTO-ENTMCNC: 33.3 G/DL (ref 32–36)
MCV RBC AUTO: 97 FL (ref 80–100)
MONOCYTES # BLD AUTO: 0.44 X10*3/UL (ref 0.1–1)
MONOCYTES NFR BLD AUTO: 12.6 %
NEUTROPHILS # BLD AUTO: 2.28 X10*3/UL (ref 1.2–7.7)
NEUTROPHILS NFR BLD AUTO: 65.5 %
NRBC BLD-RTO: ABNORMAL /100{WBCS}
PLATELET # BLD AUTO: 152 X10*3/UL (ref 150–450)
POTASSIUM SERPL-SCNC: 3.7 MMOL/L (ref 3.5–5.3)
PROT SERPL-MCNC: 7.4 G/DL (ref 6.4–8.2)
RBC # BLD AUTO: 4.13 X10*6/UL (ref 4–5.2)
RSV RNA RESP QL NAA+PROBE: NOT DETECTED
SARS-COV-2 RNA RESP QL NAA+PROBE: DETECTED
SODIUM SERPL-SCNC: 135 MMOL/L (ref 136–145)
WBC # BLD AUTO: 3.5 X10*3/UL (ref 4.4–11.3)

## 2023-12-29 PROCEDURE — 99284 EMERGENCY DEPT VISIT MOD MDM: CPT

## 2023-12-29 PROCEDURE — 2500000004 HC RX 250 GENERAL PHARMACY W/ HCPCS (ALT 636 FOR OP/ED): Performed by: EMERGENCY MEDICINE

## 2023-12-29 PROCEDURE — 36415 COLL VENOUS BLD VENIPUNCTURE: CPT | Performed by: STUDENT IN AN ORGANIZED HEALTH CARE EDUCATION/TRAINING PROGRAM

## 2023-12-29 PROCEDURE — 2500000001 HC RX 250 WO HCPCS SELF ADMINISTERED DRUGS (ALT 637 FOR MEDICARE OP): Performed by: EMERGENCY MEDICINE

## 2023-12-29 PROCEDURE — 96361 HYDRATE IV INFUSION ADD-ON: CPT

## 2023-12-29 PROCEDURE — 71046 X-RAY EXAM CHEST 2 VIEWS: CPT

## 2023-12-29 PROCEDURE — 87636 SARSCOV2 & INF A&B AMP PRB: CPT | Performed by: STUDENT IN AN ORGANIZED HEALTH CARE EDUCATION/TRAINING PROGRAM

## 2023-12-29 PROCEDURE — 96375 TX/PRO/DX INJ NEW DRUG ADDON: CPT

## 2023-12-29 PROCEDURE — 85025 COMPLETE CBC W/AUTO DIFF WBC: CPT | Performed by: STUDENT IN AN ORGANIZED HEALTH CARE EDUCATION/TRAINING PROGRAM

## 2023-12-29 PROCEDURE — 96374 THER/PROPH/DIAG INJ IV PUSH: CPT

## 2023-12-29 PROCEDURE — 2500000004 HC RX 250 GENERAL PHARMACY W/ HCPCS (ALT 636 FOR OP/ED): Performed by: STUDENT IN AN ORGANIZED HEALTH CARE EDUCATION/TRAINING PROGRAM

## 2023-12-29 PROCEDURE — 80053 COMPREHEN METABOLIC PANEL: CPT | Performed by: STUDENT IN AN ORGANIZED HEALTH CARE EDUCATION/TRAINING PROGRAM

## 2023-12-29 PROCEDURE — 87634 RSV DNA/RNA AMP PROBE: CPT | Performed by: STUDENT IN AN ORGANIZED HEALTH CARE EDUCATION/TRAINING PROGRAM

## 2023-12-29 RX ORDER — PREDNISONE 10 MG/1
50 TABLET ORAL DAILY
Qty: 25 TABLET | Refills: 0 | Status: SHIPPED | OUTPATIENT
Start: 2023-12-30 | End: 2024-01-04

## 2023-12-29 RX ORDER — DOXYCYCLINE 100 MG/1
100 CAPSULE ORAL ONCE
Status: COMPLETED | OUTPATIENT
Start: 2023-12-29 | End: 2023-12-29

## 2023-12-29 RX ORDER — DOXYCYCLINE 100 MG/1
100 CAPSULE ORAL 2 TIMES DAILY
Qty: 20 CAPSULE | Refills: 0 | Status: SHIPPED | OUTPATIENT
Start: 2023-12-29 | End: 2024-01-08

## 2023-12-29 RX ORDER — KETOROLAC TROMETHAMINE 30 MG/ML
30 INJECTION, SOLUTION INTRAMUSCULAR; INTRAVENOUS ONCE
Status: COMPLETED | OUTPATIENT
Start: 2023-12-29 | End: 2023-12-29

## 2023-12-29 RX ADMIN — KETOROLAC TROMETHAMINE 30 MG: 30 INJECTION, SOLUTION INTRAMUSCULAR; INTRAVENOUS at 06:57

## 2023-12-29 RX ADMIN — METHYLPREDNISOLONE SODIUM SUCCINATE 125 MG: 125 INJECTION, POWDER, FOR SOLUTION INTRAMUSCULAR; INTRAVENOUS at 08:07

## 2023-12-29 RX ADMIN — DOXYCYCLINE HYCLATE 100 MG: 100 CAPSULE ORAL at 08:07

## 2023-12-29 RX ADMIN — SODIUM CHLORIDE 1000 ML: 900 INJECTION, SOLUTION INTRAVENOUS at 06:57

## 2023-12-29 ASSESSMENT — COLUMBIA-SUICIDE SEVERITY RATING SCALE - C-SSRS
1. IN THE PAST MONTH, HAVE YOU WISHED YOU WERE DEAD OR WISHED YOU COULD GO TO SLEEP AND NOT WAKE UP?: NO
2. HAVE YOU ACTUALLY HAD ANY THOUGHTS OF KILLING YOURSELF?: NO
6. HAVE YOU EVER DONE ANYTHING, STARTED TO DO ANYTHING, OR PREPARED TO DO ANYTHING TO END YOUR LIFE?: NO

## 2023-12-29 ASSESSMENT — PAIN DESCRIPTION - LOCATION: LOCATION: GENERALIZED

## 2023-12-29 ASSESSMENT — PAIN SCALES - GENERAL
PAINLEVEL_OUTOF10: 9
PAINLEVEL_OUTOF10: 9

## 2023-12-29 ASSESSMENT — PAIN - FUNCTIONAL ASSESSMENT
PAIN_FUNCTIONAL_ASSESSMENT: 0-10
PAIN_FUNCTIONAL_ASSESSMENT: 0-10

## 2023-12-29 NOTE — Clinical Note
Leonardo Hardy was seen and treated in our emergency department on 12/29/2023.  She may return to work on 01/04/2024.       If you have any questions or concerns, please don't hesitate to call.      Janelle Guerra MD

## 2023-12-29 NOTE — DISCHARGE INSTRUCTIONS
Take medications as directed  Continue using your inhaler  Return to the ER should she develop increasing shortness of breath or fever that does not respond to Tylenol or Motrin

## 2023-12-29 NOTE — ED PROVIDER NOTES
HPI   Chief Complaint   Patient presents with    Flu Symptoms     Pt presents to the ED for cough and vomiting. Pt says it started 4 days ago. Pt has been taking Advil cold and flu. Pt denies fever.        This is a 28-year-old female with history of exercise-induced asthma who presents to the ED with 4 days of nausea/vomiting, cough, and myalgias.  States that she took NyQuil last night but has not taken anything since.  Denies fever.  Her daughter has had a cough for a week.  Has not taken any COVID tests at home.  Patient reports that she has not eaten anything for 2 days and feels extremely weak.      Review of Systems   All other systems reviewed and are negative.                        Sasha Coma Scale Score: 15                  Patient History   Past Medical History:   Diagnosis Date    Asthma     Colon cancer (CMS/HCC)     Hidradenitis suppurativa      Past Surgical History:   Procedure Laterality Date    CT ANGIO NECK  12/26/2020    CT ANGIO NECK 12/26/2020     No family history on file.  Social History     Tobacco Use    Smoking status: Never    Smokeless tobacco: Never    Tobacco comments:     Canibus occasionally   Vaping Use    Vaping Use: Not on file   Substance Use Topics    Alcohol use: Yes     Alcohol/week: 1.0 standard drink of alcohol     Types: 1 Glasses of wine per week     Comment: occassional    Drug use: Yes     Frequency: 1.0 times per week     Types: Marijuana       Physical Exam   ED Triage Vitals [12/29/23 0634]   Temp Heart Rate Resp BP   37.5 °C (99.5 °F) 89 20 132/88      SpO2 Temp Source Heart Rate Source Patient Position   96 % Oral -- Sitting      BP Location FiO2 (%)     Left arm --       Physical Exam  Vitals and nursing note reviewed.   Constitutional:       General: She is not in acute distress.     Appearance: She is well-developed.   HENT:      Head: Normocephalic and atraumatic.      Nose: Congestion present.   Eyes:      Extraocular Movements: Extraocular movements intact.       Conjunctiva/sclera: Conjunctivae normal.      Pupils: Pupils are equal, round, and reactive to light.   Cardiovascular:      Rate and Rhythm: Normal rate and regular rhythm.      Heart sounds: No murmur heard.  Pulmonary:      Effort: Pulmonary effort is normal. No respiratory distress.      Breath sounds: Wheezing present. No rhonchi or rales.   Abdominal:      Palpations: Abdomen is soft.      Tenderness: There is no abdominal tenderness.   Musculoskeletal:         General: No swelling.      Cervical back: Neck supple.   Skin:     General: Skin is warm and dry.   Neurological:      General: No focal deficit present.      Mental Status: She is alert and oriented to person, place, and time. Mental status is at baseline.   Psychiatric:         Mood and Affect: Mood normal.         ED Course & MDM   Diagnoses as of 12/29/23 8273   COVID   Intermittent asthma without complication, unspecified asthma severity       Medical Decision Making  COVID came back positive  Chest x-ray is negative  Clinically patient is stable  She has been given IV Solu-Medrol and p.o. doxycycline for exacerbation of her asthma and probable bronchitis secondary to COVID  She is to continue her inhaler  Follow-up with her PCP  Discharged home with a short course of prednisone and doxycycline       Janelle Guerra MD  12/29/23 0083

## 2024-02-06 ENCOUNTER — HOSPITAL ENCOUNTER (EMERGENCY)
Facility: HOSPITAL | Age: 29
Discharge: HOME | End: 2024-02-06
Attending: STUDENT IN AN ORGANIZED HEALTH CARE EDUCATION/TRAINING PROGRAM
Payer: MEDICAID

## 2024-02-06 ENCOUNTER — APPOINTMENT (OUTPATIENT)
Dept: RADIOLOGY | Facility: HOSPITAL | Age: 29
End: 2024-02-06
Payer: MEDICAID

## 2024-02-06 VITALS
DIASTOLIC BLOOD PRESSURE: 71 MMHG | OXYGEN SATURATION: 99 % | WEIGHT: 223.55 LBS | RESPIRATION RATE: 18 BRPM | SYSTOLIC BLOOD PRESSURE: 125 MMHG | HEIGHT: 65 IN | HEART RATE: 83 BPM | TEMPERATURE: 99 F | BODY MASS INDEX: 37.25 KG/M2

## 2024-02-06 DIAGNOSIS — J10.1 INFLUENZA B: Primary | ICD-10-CM

## 2024-02-06 DIAGNOSIS — E87.6 HYPOKALEMIA: ICD-10-CM

## 2024-02-06 LAB
ANION GAP SERPL CALC-SCNC: 13 MMOL/L (ref 10–20)
BASOPHILS # BLD AUTO: 0.01 X10*3/UL (ref 0–0.1)
BASOPHILS NFR BLD AUTO: 0.4 %
BUN SERPL-MCNC: 5 MG/DL (ref 6–23)
CALCIUM SERPL-MCNC: 8.9 MG/DL (ref 8.6–10.3)
CHLORIDE SERPL-SCNC: 104 MMOL/L (ref 98–107)
CO2 SERPL-SCNC: 24 MMOL/L (ref 21–32)
CREAT SERPL-MCNC: 0.68 MG/DL (ref 0.5–1.05)
EGFRCR SERPLBLD CKD-EPI 2021: >90 ML/MIN/1.73M*2
EOSINOPHIL # BLD AUTO: 0.01 X10*3/UL (ref 0–0.7)
EOSINOPHIL NFR BLD AUTO: 0.4 %
ERYTHROCYTE [DISTWIDTH] IN BLOOD BY AUTOMATED COUNT: 12.9 % (ref 11.5–14.5)
FLUAV RNA RESP QL NAA+PROBE: NOT DETECTED
FLUBV RNA RESP QL NAA+PROBE: DETECTED
GLUCOSE SERPL-MCNC: 109 MG/DL (ref 74–99)
HCG UR QL IA.RAPID: NEGATIVE
HCT VFR BLD AUTO: 37.1 % (ref 36–46)
HGB BLD-MCNC: 12.3 G/DL (ref 12–16)
IMM GRANULOCYTES # BLD AUTO: 0 X10*3/UL (ref 0–0.7)
IMM GRANULOCYTES NFR BLD AUTO: 0 % (ref 0–0.9)
LYMPHOCYTES # BLD AUTO: 0.96 X10*3/UL (ref 1.2–4.8)
LYMPHOCYTES NFR BLD AUTO: 37.4 %
MCH RBC QN AUTO: 32.6 PG (ref 26–34)
MCHC RBC AUTO-ENTMCNC: 33.2 G/DL (ref 32–36)
MCV RBC AUTO: 98 FL (ref 80–100)
MONOCYTES # BLD AUTO: 0.44 X10*3/UL (ref 0.1–1)
MONOCYTES NFR BLD AUTO: 17.1 %
NEUTROPHILS # BLD AUTO: 1.15 X10*3/UL (ref 1.2–7.7)
NEUTROPHILS NFR BLD AUTO: 44.7 %
NRBC BLD-RTO: ABNORMAL /100{WBCS}
PLATELET # BLD AUTO: 124 X10*3/UL (ref 150–450)
POTASSIUM SERPL-SCNC: 3.2 MMOL/L (ref 3.5–5.3)
RBC # BLD AUTO: 3.77 X10*6/UL (ref 4–5.2)
RSV RNA RESP QL NAA+PROBE: NOT DETECTED
SARS-COV-2 RNA RESP QL NAA+PROBE: DETECTED
SODIUM SERPL-SCNC: 138 MMOL/L (ref 136–145)
WBC # BLD AUTO: 2.6 X10*3/UL (ref 4.4–11.3)

## 2024-02-06 PROCEDURE — 71046 X-RAY EXAM CHEST 2 VIEWS: CPT

## 2024-02-06 PROCEDURE — 71046 X-RAY EXAM CHEST 2 VIEWS: CPT | Mod: FOREIGN READ | Performed by: RADIOLOGY

## 2024-02-06 PROCEDURE — 85025 COMPLETE CBC W/AUTO DIFF WBC: CPT | Performed by: STUDENT IN AN ORGANIZED HEALTH CARE EDUCATION/TRAINING PROGRAM

## 2024-02-06 PROCEDURE — 96374 THER/PROPH/DIAG INJ IV PUSH: CPT | Performed by: STUDENT IN AN ORGANIZED HEALTH CARE EDUCATION/TRAINING PROGRAM

## 2024-02-06 PROCEDURE — 96375 TX/PRO/DX INJ NEW DRUG ADDON: CPT | Performed by: STUDENT IN AN ORGANIZED HEALTH CARE EDUCATION/TRAINING PROGRAM

## 2024-02-06 PROCEDURE — 87637 SARSCOV2&INF A&B&RSV AMP PRB: CPT | Performed by: STUDENT IN AN ORGANIZED HEALTH CARE EDUCATION/TRAINING PROGRAM

## 2024-02-06 PROCEDURE — 94640 AIRWAY INHALATION TREATMENT: CPT

## 2024-02-06 PROCEDURE — 96361 HYDRATE IV INFUSION ADD-ON: CPT | Performed by: STUDENT IN AN ORGANIZED HEALTH CARE EDUCATION/TRAINING PROGRAM

## 2024-02-06 PROCEDURE — 2500000002 HC RX 250 W HCPCS SELF ADMINISTERED DRUGS (ALT 637 FOR MEDICARE OP, ALT 636 FOR OP/ED): Performed by: STUDENT IN AN ORGANIZED HEALTH CARE EDUCATION/TRAINING PROGRAM

## 2024-02-06 PROCEDURE — 2500000004 HC RX 250 GENERAL PHARMACY W/ HCPCS (ALT 636 FOR OP/ED): Performed by: STUDENT IN AN ORGANIZED HEALTH CARE EDUCATION/TRAINING PROGRAM

## 2024-02-06 PROCEDURE — 80048 BASIC METABOLIC PNL TOTAL CA: CPT | Performed by: STUDENT IN AN ORGANIZED HEALTH CARE EDUCATION/TRAINING PROGRAM

## 2024-02-06 PROCEDURE — 81025 URINE PREGNANCY TEST: CPT | Performed by: STUDENT IN AN ORGANIZED HEALTH CARE EDUCATION/TRAINING PROGRAM

## 2024-02-06 PROCEDURE — 99284 EMERGENCY DEPT VISIT MOD MDM: CPT | Mod: 25 | Performed by: STUDENT IN AN ORGANIZED HEALTH CARE EDUCATION/TRAINING PROGRAM

## 2024-02-06 PROCEDURE — 36415 COLL VENOUS BLD VENIPUNCTURE: CPT | Performed by: STUDENT IN AN ORGANIZED HEALTH CARE EDUCATION/TRAINING PROGRAM

## 2024-02-06 RX ORDER — DEXAMETHASONE SODIUM PHOSPHATE 100 MG/10ML
10 INJECTION INTRAMUSCULAR; INTRAVENOUS ONCE
Status: COMPLETED | OUTPATIENT
Start: 2024-02-06 | End: 2024-02-06

## 2024-02-06 RX ORDER — OSELTAMIVIR PHOSPHATE 75 MG/1
75 CAPSULE ORAL EVERY 12 HOURS
Qty: 9 CAPSULE | Refills: 0 | Status: SHIPPED | OUTPATIENT
Start: 2024-02-06 | End: 2024-02-11

## 2024-02-06 RX ORDER — ONDANSETRON HYDROCHLORIDE 2 MG/ML
4 INJECTION, SOLUTION INTRAVENOUS EVERY 4 HOURS PRN
Status: DISCONTINUED | OUTPATIENT
Start: 2024-02-06 | End: 2024-02-06 | Stop reason: HOSPADM

## 2024-02-06 RX ORDER — OSELTAMIVIR PHOSPHATE 75 MG/1
75 CAPSULE ORAL ONCE
Status: COMPLETED | OUTPATIENT
Start: 2024-02-06 | End: 2024-02-06

## 2024-02-06 RX ORDER — POTASSIUM CHLORIDE 20 MEQ/1
40 TABLET, EXTENDED RELEASE ORAL ONCE
Status: COMPLETED | OUTPATIENT
Start: 2024-02-06 | End: 2024-02-06

## 2024-02-06 RX ORDER — KETOROLAC TROMETHAMINE 30 MG/ML
30 INJECTION, SOLUTION INTRAMUSCULAR; INTRAVENOUS ONCE
Status: COMPLETED | OUTPATIENT
Start: 2024-02-06 | End: 2024-02-06

## 2024-02-06 RX ORDER — IPRATROPIUM BROMIDE AND ALBUTEROL SULFATE 2.5; .5 MG/3ML; MG/3ML
3 SOLUTION RESPIRATORY (INHALATION) ONCE
Status: COMPLETED | OUTPATIENT
Start: 2024-02-06 | End: 2024-02-06

## 2024-02-06 RX ADMIN — OSELTAMIVIR PHOSPHATE 75 MG: 75 CAPSULE ORAL at 06:11

## 2024-02-06 RX ADMIN — KETOROLAC TROMETHAMINE 30 MG: 30 INJECTION INTRAMUSCULAR; INTRAVENOUS at 05:01

## 2024-02-06 RX ADMIN — POTASSIUM CHLORIDE 40 MEQ: 1500 TABLET, EXTENDED RELEASE ORAL at 06:11

## 2024-02-06 RX ADMIN — SODIUM CHLORIDE 1000 ML: 900 INJECTION, SOLUTION INTRAVENOUS at 05:02

## 2024-02-06 RX ADMIN — IPRATROPIUM BROMIDE AND ALBUTEROL SULFATE 3 ML: 2.5; .5 SOLUTION RESPIRATORY (INHALATION) at 04:41

## 2024-02-06 RX ADMIN — DEXAMETHASONE SODIUM PHOSPHATE 10 MG: 10 INJECTION INTRAMUSCULAR; INTRAVENOUS at 05:01

## 2024-02-06 ASSESSMENT — PAIN DESCRIPTION - LOCATION: LOCATION: CHEST

## 2024-02-06 ASSESSMENT — PAIN SCALES - GENERAL
PAINLEVEL_OUTOF10: 6
PAINLEVEL_OUTOF10: 8
PAINLEVEL_OUTOF10: 6

## 2024-02-06 ASSESSMENT — PAIN - FUNCTIONAL ASSESSMENT
PAIN_FUNCTIONAL_ASSESSMENT: 0-10

## 2024-02-06 ASSESSMENT — COLUMBIA-SUICIDE SEVERITY RATING SCALE - C-SSRS
6. HAVE YOU EVER DONE ANYTHING, STARTED TO DO ANYTHING, OR PREPARED TO DO ANYTHING TO END YOUR LIFE?: NO
2. HAVE YOU ACTUALLY HAD ANY THOUGHTS OF KILLING YOURSELF?: NO
1. IN THE PAST MONTH, HAVE YOU WISHED YOU WERE DEAD OR WISHED YOU COULD GO TO SLEEP AND NOT WAKE UP?: NO

## 2024-02-06 ASSESSMENT — PAIN DESCRIPTION - PAIN TYPE
TYPE: ACUTE PAIN
TYPE: ACUTE PAIN

## 2024-02-06 NOTE — DISCHARGE INSTRUCTIONS
Take Tamiflu as directed. May continue to use inhaler as before for asthma symptoms. Practice good hand hygiene. Rest and drink plenty of fluids.

## 2024-02-06 NOTE — Clinical Note
Leonardo Hardy was seen and treated in our emergency department on 2/6/2024.  She may return to work on 02/08/2024.       If you have any questions or concerns, please don't hesitate to call.      Yajaira Robles MD

## 2024-02-06 NOTE — ED PROVIDER NOTES
HPI   Chief Complaint   Patient presents with    Cough     Patient states that she went to the urgent care yesterday for cough for 4 days. Patient states that she was told she has bronchitis but she feels worse today. Patient complains of pain from coughing.  Patient states that she has been taking robitussin.       This is a 28-year-old female with history of exercise-induced asthma and recent COVID infection 1 month ago who presents to the ED with cough and chest pain secondary to coughing.  States that the cough started about 4 days ago.  She went to urgent care and was diagnosed with bronchitis, but states that her symptoms have worsened.  Has been using over-the-counter medications without much relief.  She has children, but they are not currently ill; they had pinkeye last week.  She denies fevers or shortness of breath.      Review of Systems   All other systems reviewed and are negative.                        Sasha Coma Scale Score: 15                  Patient History   Past Medical History:   Diagnosis Date    Asthma     Colon cancer (CMS/HCC)     Hidradenitis suppurativa      Past Surgical History:   Procedure Laterality Date    CT ANGIO NECK  12/26/2020    CT ANGIO NECK 12/26/2020     No family history on file.  Social History     Tobacco Use    Smoking status: Never    Smokeless tobacco: Never    Tobacco comments:     Canibus occasionally   Vaping Use    Vaping Use: Not on file   Substance Use Topics    Alcohol use: Yes     Alcohol/week: 1.0 standard drink of alcohol     Types: 1 Glasses of wine per week     Comment: occassional    Drug use: Yes     Frequency: 1.0 times per week     Types: Marijuana       Physical Exam   ED Triage Vitals [02/06/24 0436]   Temperature Heart Rate Respirations BP   37.2 °C (99 °F) 89 18 (!) 136/98      Pulse Ox Temp Source Heart Rate Source Patient Position   98 % Oral Monitor --      BP Location FiO2 (%)     -- --       Physical Exam  Vitals and nursing note reviewed.    Constitutional:       General: She is not in acute distress.     Appearance: Normal appearance. She is well-developed.   HENT:      Head: Normocephalic and atraumatic.   Eyes:      Extraocular Movements: Extraocular movements intact.      Conjunctiva/sclera: Conjunctivae normal.      Pupils: Pupils are equal, round, and reactive to light.   Cardiovascular:      Rate and Rhythm: Normal rate and regular rhythm.      Heart sounds: No murmur heard.  Pulmonary:      Effort: Pulmonary effort is normal. No respiratory distress.      Breath sounds: Normal breath sounds.   Abdominal:      Palpations: Abdomen is soft.      Tenderness: There is no abdominal tenderness.   Musculoskeletal:         General: No swelling.      Cervical back: Neck supple.   Skin:     General: Skin is warm and dry.   Neurological:      General: No focal deficit present.      Mental Status: She is alert. Mental status is at baseline.   Psychiatric:         Mood and Affect: Mood normal.         Behavior: Behavior normal.         Thought Content: Thought content normal.         Judgment: Judgment normal.       Results for orders placed or performed during the hospital encounter of 02/06/24 (from the past 24 hour(s))   Sars-CoV-2 PCR   Result Value Ref Range    Coronavirus 2019, PCR Detected (A) Not Detected   Influenza A, and B PCR   Result Value Ref Range    Flu A Result Not Detected Not Detected    Flu B Result Detected (A) Not Detected   RSV PCR   Result Value Ref Range    RSV PCR Not Detected Not Detected   hCG, Urine, Qualitative   Result Value Ref Range    HCG, Urine NEGATIVE NEGATIVE   CBC and Auto Differential   Result Value Ref Range    WBC 2.6 (L) 4.4 - 11.3 x10*3/uL    nRBC      RBC 3.77 (L) 4.00 - 5.20 x10*6/uL    Hemoglobin 12.3 12.0 - 16.0 g/dL    Hematocrit 37.1 36.0 - 46.0 %    MCV 98 80 - 100 fL    MCH 32.6 26.0 - 34.0 pg    MCHC 33.2 32.0 - 36.0 g/dL    RDW 12.9 11.5 - 14.5 %    Platelets 124 (L) 150 - 450 x10*3/uL    Neutrophils %  44.7 40.0 - 80.0 %    Immature Granulocytes %, Automated 0.0 0.0 - 0.9 %    Lymphocytes % 37.4 13.0 - 44.0 %    Monocytes % 17.1 2.0 - 10.0 %    Eosinophils % 0.4 0.0 - 6.0 %    Basophils % 0.4 0.0 - 2.0 %    Neutrophils Absolute 1.15 (L) 1.20 - 7.70 x10*3/uL    Immature Granulocytes Absolute, Automated 0.00 0.00 - 0.70 x10*3/uL    Lymphocytes Absolute 0.96 (L) 1.20 - 4.80 x10*3/uL    Monocytes Absolute 0.44 0.10 - 1.00 x10*3/uL    Eosinophils Absolute 0.01 0.00 - 0.70 x10*3/uL    Basophils Absolute 0.01 0.00 - 0.10 x10*3/uL   Basic metabolic panel   Result Value Ref Range    Glucose 109 (H) 74 - 99 mg/dL    Sodium 138 136 - 145 mmol/L    Potassium 3.2 (L) 3.5 - 5.3 mmol/L    Chloride 104 98 - 107 mmol/L    Bicarbonate 24 21 - 32 mmol/L    Anion Gap 13 10 - 20 mmol/L    Urea Nitrogen 5 (L) 6 - 23 mg/dL    Creatinine 0.68 0.50 - 1.05 mg/dL    eGFR >90 >60 mL/min/1.73m*2    Calcium 8.9 8.6 - 10.3 mg/dL     XR chest 2 views    Result Date: 2/6/2024  STUDY: Chest Radiographs;  2/06/2024 5:21 AM INDICATION: Cough. COMPARISON: CXR 12/29/2023. ACCESSION NUMBER(S): [id number] ORDERING CLINICIAN: NADER ANTUNEZ TECHNIQUE:  Frontal and lateral chest. FINDINGS: Both lungs are clear. The heart and mediastinum are of normal size and contour.  No pleural effusion.  No pneumothorax. No acute bony abnormality identified.    No findings of an acute cardiopulmonary process. Signed by Fili Humphries MD          ED Course & MDM   Diagnoses as of 02/06/24 0613   Influenza B   Hypokalemia       Medical Decision Making  Patient presented to the ED with 4 days of worsening cough and chest pain.  She had COVID a month ago, and her COVID swab returned positive again today.  She additionally tested positive for influenza B.  Suspect that the latter is more the cause of her symptoms at this time, and since she had worsening symptoms and has history of asthma, feel that it is reasonable to start her on Tamiflu despite her symptoms  starting greater than 48 hours ago.  She was additionally given a DuoNeb treatment and Decadron in the ED which she states helped her symptoms, and Toradol, which brought her pain level down from an 8 to a 6.  Her lung exam was clear and CXR was negative.  She can continue to use albuterol as needed at home as well as the over-the-counter medications she has been using thus far.  Work excuse provided per patient request.  Potassium was replaced.           Yajaira Robles MD  02/06/24 0637

## 2024-04-13 ENCOUNTER — HOSPITAL ENCOUNTER (EMERGENCY)
Facility: HOSPITAL | Age: 29
Discharge: HOME | End: 2024-04-13
Attending: FAMILY MEDICINE
Payer: MEDICAID

## 2024-04-13 VITALS
SYSTOLIC BLOOD PRESSURE: 129 MMHG | WEIGHT: 229.94 LBS | OXYGEN SATURATION: 98 % | RESPIRATION RATE: 16 BRPM | HEIGHT: 65 IN | TEMPERATURE: 98.8 F | BODY MASS INDEX: 38.31 KG/M2 | DIASTOLIC BLOOD PRESSURE: 87 MMHG | HEART RATE: 84 BPM

## 2024-04-13 DIAGNOSIS — L73.2 HIDRADENITIS SUPPURATIVA: ICD-10-CM

## 2024-04-13 DIAGNOSIS — E87.6 HYPOKALEMIA: ICD-10-CM

## 2024-04-13 DIAGNOSIS — L02.416 ABSCESS OF LEFT LEG: Primary | ICD-10-CM

## 2024-04-13 DIAGNOSIS — E66.01 CLASS 2 SEVERE OBESITY DUE TO EXCESS CALORIES WITH SERIOUS COMORBIDITY AND BODY MASS INDEX (BMI) OF 38.0 TO 38.9 IN ADULT (MULTI): ICD-10-CM

## 2024-04-13 LAB
ANION GAP SERPL CALC-SCNC: 12 MMOL/L (ref 10–20)
BASOPHILS # BLD AUTO: 0 X10*3/UL (ref 0–0.1)
BASOPHILS NFR BLD AUTO: 0 %
BUN SERPL-MCNC: 12 MG/DL (ref 6–23)
CALCIUM SERPL-MCNC: 9.2 MG/DL (ref 8.6–10.3)
CHLORIDE SERPL-SCNC: 104 MMOL/L (ref 98–107)
CO2 SERPL-SCNC: 27 MMOL/L (ref 21–32)
CREAT SERPL-MCNC: 0.7 MG/DL (ref 0.5–1.05)
EGFRCR SERPLBLD CKD-EPI 2021: >90 ML/MIN/1.73M*2
EOSINOPHIL # BLD AUTO: 0.04 X10*3/UL (ref 0–0.7)
EOSINOPHIL NFR BLD AUTO: 0.6 %
ERYTHROCYTE [DISTWIDTH] IN BLOOD BY AUTOMATED COUNT: 13.7 % (ref 11.5–14.5)
GLUCOSE SERPL-MCNC: 88 MG/DL (ref 74–99)
HCT VFR BLD AUTO: 39.4 % (ref 36–46)
HGB BLD-MCNC: 12.6 G/DL (ref 12–16)
IMM GRANULOCYTES # BLD AUTO: 0.01 X10*3/UL (ref 0–0.7)
IMM GRANULOCYTES NFR BLD AUTO: 0.2 % (ref 0–0.9)
LYMPHOCYTES # BLD AUTO: 1.92 X10*3/UL (ref 1.2–4.8)
LYMPHOCYTES NFR BLD AUTO: 30 %
MCH RBC QN AUTO: 32.3 PG (ref 26–34)
MCHC RBC AUTO-ENTMCNC: 32 G/DL (ref 32–36)
MCV RBC AUTO: 101 FL (ref 80–100)
MONOCYTES # BLD AUTO: 0.43 X10*3/UL (ref 0.1–1)
MONOCYTES NFR BLD AUTO: 6.7 %
NEUTROPHILS # BLD AUTO: 3.99 X10*3/UL (ref 1.2–7.7)
NEUTROPHILS NFR BLD AUTO: 62.5 %
NRBC BLD-RTO: ABNORMAL /100{WBCS}
PLATELET # BLD AUTO: 203 X10*3/UL (ref 150–450)
POTASSIUM SERPL-SCNC: 3.9 MMOL/L (ref 3.5–5.3)
RBC # BLD AUTO: 3.9 X10*6/UL (ref 4–5.2)
SODIUM SERPL-SCNC: 139 MMOL/L (ref 136–145)
WBC # BLD AUTO: 6.4 X10*3/UL (ref 4.4–11.3)

## 2024-04-13 PROCEDURE — 36415 COLL VENOUS BLD VENIPUNCTURE: CPT | Performed by: FAMILY MEDICINE

## 2024-04-13 PROCEDURE — 80048 BASIC METABOLIC PNL TOTAL CA: CPT | Performed by: FAMILY MEDICINE

## 2024-04-13 PROCEDURE — 2500000005 HC RX 250 GENERAL PHARMACY W/O HCPCS: Performed by: FAMILY MEDICINE

## 2024-04-13 PROCEDURE — 2500000004 HC RX 250 GENERAL PHARMACY W/ HCPCS (ALT 636 FOR OP/ED): Performed by: FAMILY MEDICINE

## 2024-04-13 PROCEDURE — 99284 EMERGENCY DEPT VISIT MOD MDM: CPT | Mod: 25

## 2024-04-13 PROCEDURE — 10061 I&D ABSCESS COMP/MULTIPLE: CPT | Performed by: FAMILY MEDICINE

## 2024-04-13 PROCEDURE — 87205 SMEAR GRAM STAIN: CPT | Mod: BEALAB | Performed by: FAMILY MEDICINE

## 2024-04-13 PROCEDURE — 96365 THER/PROPH/DIAG IV INF INIT: CPT | Mod: 59

## 2024-04-13 PROCEDURE — 85025 COMPLETE CBC W/AUTO DIFF WBC: CPT | Performed by: FAMILY MEDICINE

## 2024-04-13 PROCEDURE — 96375 TX/PRO/DX INJ NEW DRUG ADDON: CPT | Mod: 59

## 2024-04-13 PROCEDURE — 2500000005 HC RX 250 GENERAL PHARMACY W/O HCPCS: Performed by: PHARMACIST

## 2024-04-13 RX ORDER — CEPHALEXIN 500 MG/1
500 CAPSULE ORAL 4 TIMES DAILY
Qty: 40 CAPSULE | Refills: 0 | Status: SHIPPED | OUTPATIENT
Start: 2024-04-13 | End: 2024-04-23

## 2024-04-13 RX ORDER — CEFTRIAXONE 1 G/50ML
1 INJECTION, SOLUTION INTRAVENOUS ONCE
Status: COMPLETED | OUTPATIENT
Start: 2024-04-13 | End: 2024-04-13

## 2024-04-13 RX ORDER — LIDOCAINE HYDROCHLORIDE 10 MG/ML
2 INJECTION, SOLUTION EPIDURAL; INFILTRATION; INTRACAUDAL; PERINEURAL ONCE
Status: COMPLETED | OUTPATIENT
Start: 2024-04-13 | End: 2024-04-13

## 2024-04-13 RX ORDER — LIDOCAINE HYDROCHLORIDE 10 MG/ML
5 INJECTION INFILTRATION; PERINEURAL ONCE
Status: COMPLETED | OUTPATIENT
Start: 2024-04-13 | End: 2024-04-13

## 2024-04-13 RX ORDER — KETOROLAC TROMETHAMINE 30 MG/ML
30 INJECTION, SOLUTION INTRAMUSCULAR; INTRAVENOUS ONCE
Status: COMPLETED | OUTPATIENT
Start: 2024-04-13 | End: 2024-04-13

## 2024-04-13 RX ADMIN — LIDOCAINE HYDROCHLORIDE 50 MG: 10 INJECTION, SOLUTION EPIDURAL; INFILTRATION; INTRACAUDAL; PERINEURAL at 10:52

## 2024-04-13 RX ADMIN — CEFTRIAXONE 1 G: 1 INJECTION, SOLUTION INTRAVENOUS at 10:47

## 2024-04-13 RX ADMIN — KETOROLAC TROMETHAMINE 30 MG: 30 INJECTION INTRAMUSCULAR; INTRAVENOUS at 10:47

## 2024-04-13 RX ADMIN — LIDOCAINE HYDROCHLORIDE 20 MG: 10 INJECTION, SOLUTION EPIDURAL; INFILTRATION; INTRACAUDAL; PERINEURAL at 09:40

## 2024-04-13 ASSESSMENT — PAIN - FUNCTIONAL ASSESSMENT
PAIN_FUNCTIONAL_ASSESSMENT: 0-10
PAIN_FUNCTIONAL_ASSESSMENT: 0-10

## 2024-04-13 ASSESSMENT — PAIN DESCRIPTION - PAIN TYPE: TYPE: ACUTE PAIN

## 2024-04-13 ASSESSMENT — COLUMBIA-SUICIDE SEVERITY RATING SCALE - C-SSRS
6. HAVE YOU EVER DONE ANYTHING, STARTED TO DO ANYTHING, OR PREPARED TO DO ANYTHING TO END YOUR LIFE?: NO
1. IN THE PAST MONTH, HAVE YOU WISHED YOU WERE DEAD OR WISHED YOU COULD GO TO SLEEP AND NOT WAKE UP?: NO
2. HAVE YOU ACTUALLY HAD ANY THOUGHTS OF KILLING YOURSELF?: NO

## 2024-04-13 ASSESSMENT — PAIN DESCRIPTION - PROGRESSION: CLINICAL_PROGRESSION: NOT CHANGED

## 2024-04-13 ASSESSMENT — PAIN DESCRIPTION - LOCATION: LOCATION: LEG

## 2024-04-13 ASSESSMENT — PAIN SCALES - GENERAL
PAINLEVEL_OUTOF10: 10 - WORST POSSIBLE PAIN
PAINLEVEL_OUTOF10: 10 - WORST POSSIBLE PAIN
PAINLEVEL_OUTOF10: 8

## 2024-04-13 NOTE — ED PROVIDER NOTES
HPI   Chief Complaint   Patient presents with    Abscess     States left inner thigh abscess x 3 days       28-year-old morbidly obese female with a history of hidradenitis suppurativa.  She reports that she has had 4-5 outbreaks of abscesses 2/2 the hidradenitis over the last year.  Recently, she was evaluated at [facility] on 3/24/2024 for a labial abscess, that was treated with Augmentin.  She completed the Augmentin about a week ago.    Patient presents today with a 3-day history of a mass in her left upper inner thigh.  She reports the mass is very painful, and seems to be getting bigger.    She denies fever, sweats, chills, nausea, vomiting.    PMH:  Hidradenitis Suppurativa  Hypokalemia  ASCUS HPV  Herpes Labialis  STDs  Anemia of Pregnancy  Morbid Obesity (BMI 38.26)   Daily THC use      History provided by:  Patient   used: No                        No data recorded                   Patient History   Past Medical History:   Diagnosis Date    Asthma ([facility]-HCC)     Colon cancer (Multi)     Hidradenitis suppurativa      Past Surgical History:   Procedure Laterality Date    CT ANGIO NECK  12/26/2020    CT ANGIO NECK 12/26/2020     No family history on file.  Social History     Tobacco Use    Smoking status: Never    Smokeless tobacco: Never    Tobacco comments:     Canibus occasionally   Vaping Use    Vaping status: Not on file   Substance Use Topics    Alcohol use: Yes     Alcohol/week: 1.0 standard drink of alcohol     Types: 1 Glasses of wine per week     Comment: occassional    Drug use: Yes     Frequency: 1.0 times per week     Types: Marijuana       Physical Exam   ED Triage Vitals [04/13/24 0833]   Temperature Heart Rate Respirations BP   37.1 °C (98.8 °F) 86 18 135/90      Pulse Ox Temp Source Heart Rate Source Patient Position   100 % Oral Monitor Sitting      BP Location FiO2 (%)     Left arm --       Physical Exam  Constitutional:       Appearance: Normal  appearance. She is obese.   HENT:      Head: Normocephalic.   Eyes:      Extraocular Movements: Extraocular movements intact.      Conjunctiva/sclera: Conjunctivae normal.   Genitourinary:     Comments: 6 cm diameter, hard, painful mass in the left upper inner thigh.  Slightly more proximal and medial, is an old scar of a prior, similar abscess.  Neurological:      Mental Status: She is alert.       ED Course & MDM   Diagnoses as of 04/13/24 1050   Abscess of left leg   Hidradenitis suppurativa   Class 2 severe obesity due to excess calories with serious comorbidity and body mass index (BMI) of 38.0 to 38.9 in adult (Multi)   Hypokalemia       Medical Decision Making  Patient was given ceftriaxone 1 g IV along with ketorolac 30 mg IV.    The mass was I&D'd per procedure (see note) with a large volume of pus removed.  Wound was then packed with half-inch sterile packing, and a pressure dressing applied.    Dressing management was discussed with the patient at length.  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  Discussed at length the repacking process and the importance of closing the abscess from the inside-out.      See labs for results of CBC and BMP.  Deficiencies supplemented as needed.    A Cephalexin prescription was forwarded to her pharmacy.    Strongly encouraged the patient that her priority at this point should be weight loss, which will contribute to decreasing her risk for future, similar abscesses.    Amount and/or Complexity of Data Reviewed  External Data Reviewed: labs and notes.  Labs: ordered. Decision-making details documented in ED Course.        Procedure  Incision and Drainage    Performed by: Aiden Erwin MD  Authorized by: Aiden Erwin MD    Consent:     Consent obtained:  Verbal    Consent given by:  Patient    Risks discussed:  Bleeding,  infection and pain    Alternatives discussed:  Alternative treatment and referral  Universal protocol:     Procedure explained and questions answered to patient or proxy's satisfaction: yes      Relevant documents present and verified: no      Test results available : no      Imaging studies available: no      Required blood products, implants, devices, and special equipment available: no      Site/side marked: yes      Immediately prior to procedure, a time out was called: yes      Patient identity confirmed:  Verbally with patient  Location:     Type:  Abscess    Location:  Lower extremity  Pre-procedure details:     Skin preparation:  Povidone-iodine  Sedation:     Sedation type:  None  Anesthesia:     Anesthesia method:  Local infiltration    Local anesthetic:  Lidocaine 1% w/o epi  Procedure type:     Complexity:  Complex  Procedure details:     Ultrasound guidance: no      Needle aspiration: no      Incision types:  Single straight    Incision depth:  Subcutaneous    Wound management:  Irrigated with saline and extensive cleaning    Drainage:  Bloody and purulent    Drainage amount:  Copious    Packing materials:  1/2 in iodoform gauze  Post-procedure details:     Procedure completion:  Tolerated well, no immediate complications       Aiden Erwin MD  04/13/24 1038       Aiden Erwin MD  04/13/24 3559

## 2024-04-13 NOTE — DISCHARGE INSTRUCTIONS
You are seen today for 3-day history of an abscess of your left upper inner thigh.  You recognized this as similar to prior abscesses you have had related to your hidradenitis suppurativa.    The abscess was incised and drained.  You were given ceftriaxone 1 g IV along with ketorolac 30 mg IV for pain.    A prescription for cephalexin has been forwarded to your pharmacy.  You should take this 4 times daily until all pills are elias.    In order to avoid similar abscesses in the future, your risks would be reduced greatly by losing weight.  Strongly recommend you speak with your PCP about the use of a GLP-1 medication, get adequate sleep, healthy eating, and exercise daily.

## 2024-04-15 LAB
B-LACTAMASE ORGANISM ISLT: POSITIVE
BACTERIA SPEC CULT: ABNORMAL
GRAM STN SPEC: ABNORMAL
GRAM STN SPEC: ABNORMAL

## 2024-04-16 ENCOUNTER — TELEPHONE (OUTPATIENT)
Dept: PHARMACY | Facility: HOSPITAL | Age: 29
End: 2024-04-16
Payer: MEDICAID

## 2024-04-16 DIAGNOSIS — L08.9 WOUND INFECTION: Primary | ICD-10-CM

## 2024-04-16 DIAGNOSIS — T14.8XXA WOUND INFECTION: Primary | ICD-10-CM

## 2024-04-16 RX ORDER — AMOXICILLIN AND CLAVULANATE POTASSIUM 875; 125 MG/1; MG/1
875 TABLET, FILM COATED ORAL 2 TIMES DAILY
Qty: 20 TABLET | Refills: 0 | Status: SHIPPED | OUTPATIENT
Start: 2024-04-16 | End: 2024-04-26

## 2024-04-16 NOTE — PROGRESS NOTES
EDPD Note: Bug-Drug Mismatch    Contacted Ms. Leonardo Hardy regarding a positive wound culture that was taken during their recent emergency room visit. I completed education with patient. The patient is not being treated appropriately with Keflex 500 mg QID x 10 days.     Patient presented with an abscess of the left leg. I&D was performed and antibiotics were prescribed at discharge.    The following prescription was sent to the patient's preferred pharmacy. No further follow up needed from EDPD Team.   Drug: Augmentin 875 mg  Sig: Take 1 tablet by mouth twice daily for 10 days  Days Supply: 10    Susceptibility data from last 90 days.  Collected Specimen Info Organism   04/13/24 Tissue/Biopsy from Skin Lesion Mixed Anaerobic Bacteria       Isa Johnson, PharmD

## 2024-04-21 ENCOUNTER — HOSPITAL ENCOUNTER (EMERGENCY)
Facility: HOSPITAL | Age: 29
Discharge: HOME | End: 2024-04-21
Attending: EMERGENCY MEDICINE
Payer: MEDICAID

## 2024-04-21 VITALS
OXYGEN SATURATION: 100 % | DIASTOLIC BLOOD PRESSURE: 75 MMHG | SYSTOLIC BLOOD PRESSURE: 132 MMHG | HEIGHT: 65 IN | RESPIRATION RATE: 16 BRPM | WEIGHT: 227.07 LBS | HEART RATE: 60 BPM | BODY MASS INDEX: 37.83 KG/M2 | TEMPERATURE: 98.3 F

## 2024-04-21 DIAGNOSIS — L02.91 ABSCESS: Primary | ICD-10-CM

## 2024-04-21 PROCEDURE — 10060 I&D ABSCESS SIMPLE/SINGLE: CPT

## 2024-04-21 PROCEDURE — 2500000005 HC RX 250 GENERAL PHARMACY W/O HCPCS: Performed by: EMERGENCY MEDICINE

## 2024-04-21 PROCEDURE — 2500000001 HC RX 250 WO HCPCS SELF ADMINISTERED DRUGS (ALT 637 FOR MEDICARE OP): Performed by: EMERGENCY MEDICINE

## 2024-04-21 PROCEDURE — 99283 EMERGENCY DEPT VISIT LOW MDM: CPT

## 2024-04-21 RX ORDER — BACITRACIN ZINC 500 UNIT/G
OINTMENT IN PACKET (EA) TOPICAL ONCE
Status: COMPLETED | OUTPATIENT
Start: 2024-04-21 | End: 2024-04-21

## 2024-04-21 RX ORDER — LIDOCAINE HYDROCHLORIDE 10 MG/ML
5 INJECTION INFILTRATION; PERINEURAL ONCE
Status: COMPLETED | OUTPATIENT
Start: 2024-04-21 | End: 2024-04-21

## 2024-04-21 RX ADMIN — BACITRACIN ZINC 1 APPLICATION: 500 OINTMENT TOPICAL at 07:49

## 2024-04-21 RX ADMIN — LIDOCAINE HYDROCHLORIDE 50 MG: 10 INJECTION, SOLUTION INFILTRATION; PERINEURAL at 07:36

## 2024-04-21 ASSESSMENT — PAIN - FUNCTIONAL ASSESSMENT: PAIN_FUNCTIONAL_ASSESSMENT: 0-10

## 2024-04-21 ASSESSMENT — PAIN SCALES - GENERAL: PAINLEVEL_OUTOF10: 10 - WORST POSSIBLE PAIN
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
## 2024-04-23 ENCOUNTER — HOSPITAL ENCOUNTER (EMERGENCY)
Facility: HOSPITAL | Age: 29
Discharge: HOME | End: 2024-04-23
Attending: FAMILY MEDICINE
Payer: MEDICAID

## 2024-04-23 VITALS
RESPIRATION RATE: 16 BRPM | BODY MASS INDEX: 38.16 KG/M2 | OXYGEN SATURATION: 99 % | HEART RATE: 75 BPM | HEIGHT: 65 IN | TEMPERATURE: 98.6 F | WEIGHT: 229.06 LBS | SYSTOLIC BLOOD PRESSURE: 135 MMHG | DIASTOLIC BLOOD PRESSURE: 90 MMHG

## 2024-04-23 DIAGNOSIS — L02.412 ABSCESS OF AXILLA, LEFT: ICD-10-CM

## 2024-04-23 DIAGNOSIS — L73.2 HIDRADENITIS SUPPURATIVA: Primary | ICD-10-CM

## 2024-04-23 DIAGNOSIS — M79.622 PAIN IN AXILLA, LEFT: ICD-10-CM

## 2024-04-23 PROCEDURE — 87185 SC STD ENZYME DETCJ PER NZM: CPT | Mod: BEALAB | Performed by: FAMILY MEDICINE

## 2024-04-23 PROCEDURE — 2500000005 HC RX 250 GENERAL PHARMACY W/O HCPCS: Performed by: FAMILY MEDICINE

## 2024-04-23 PROCEDURE — 99283 EMERGENCY DEPT VISIT LOW MDM: CPT | Mod: 25

## 2024-04-23 PROCEDURE — 10061 I&D ABSCESS COMP/MULTIPLE: CPT | Performed by: FAMILY MEDICINE

## 2024-04-23 RX ORDER — LIDOCAINE HYDROCHLORIDE 10 MG/ML
5 INJECTION INFILTRATION; PERINEURAL ONCE
Status: COMPLETED | OUTPATIENT
Start: 2024-04-23 | End: 2024-04-23

## 2024-04-23 RX ORDER — AMOXICILLIN AND CLAVULANATE POTASSIUM 875; 125 MG/1; MG/1
1 TABLET, FILM COATED ORAL EVERY 12 HOURS
Qty: 20 TABLET | Refills: 0 | Status: SHIPPED | OUTPATIENT
Start: 2024-04-23 | End: 2024-05-03

## 2024-04-23 RX ORDER — LIDOCAINE HYDROCHLORIDE 10 MG/ML
5 INJECTION INFILTRATION; PERINEURAL ONCE
Status: DISCONTINUED | OUTPATIENT
Start: 2024-04-23 | End: 2024-04-23

## 2024-04-23 RX ADMIN — LIDOCAINE HYDROCHLORIDE 50 MG: 10 INJECTION, SOLUTION INFILTRATION; PERINEURAL at 12:51

## 2024-04-23 ASSESSMENT — PAIN DESCRIPTION - PAIN TYPE
TYPE: ACUTE PAIN
TYPE: ACUTE PAIN

## 2024-04-23 ASSESSMENT — PAIN SCALES - GENERAL
PAINLEVEL_OUTOF10: 10 - WORST POSSIBLE PAIN
PAINLEVEL_OUTOF10: 5 - MODERATE PAIN

## 2024-04-23 ASSESSMENT — PAIN - FUNCTIONAL ASSESSMENT: PAIN_FUNCTIONAL_ASSESSMENT: 0-10

## 2024-04-23 NOTE — DISCHARGE INSTRUCTIONS
You were seen today for a 3 to 4-day history of abscess formation of the left axilla.  You described this as painful, growing, without discharge.    The axillary area was cleaned in sterile fashion.  I&D was performed of the abscess with copious amounts of pus removed.  The area was then flushed with normal saline, a total of 30 mL.  Packing was then inserted and a sterile bandage applied.    Under sterile conditions, you should pull the gauze packing every other day and insert new gauze as instructed.  You should be packing less and less packing with each dressing change as the wound closes from inside out.    Prescription for Augmentin 875 mg twice daily for 10 days has been forwarded to your pharmacy. Finish your existing Augmentin prescription and then begin the new one sent today.    Strongly recommend you refrain from shaving your axillary areas.  Rather clipped the axillary hair as it grows, always keeping the axilla clean.  Avoid using antiperspirant/deodorant.    Also strongly recommend significant weight loss, as this will reduce the incidence of these abscesses forming.

## 2024-04-23 NOTE — ED PROVIDER NOTES
HPI   Chief Complaint   Patient presents with    Abscess     Pt presents to er with complaints of an abscess in her left armpit x 4 days. Pt has no other complaints and triage to ed bed 2        28-year-old morbidly obese female with a history of hidradenitis suppurativa.  She reports that she has had -65 outbreaks of abscesses 2/2 the hidradenitis over the last year.  She was seen here   April 13 for an abscess of the left upper-inner thigh. Culture at the time grew beta-lactamase abundant mixed anaerobic bacteria. She was treated w/ Augmentin and reports the lesion has healed nicely.     Prior to above, she was evaluated at [facility] on 3/24/2024 for a labial abscess, that was also treated with Augmentin.       Patient presents today with a 3-4-day history of a mass in her left axilla.  She reports the mass is painful, and seems to be getting bigger.     She denies fever, sweats, chills, nausea, vomiting.     PMH:  Hidradenitis Suppurativa  Hypokalemia  ASCUS HPV  Herpes Labialis  STDs  Anemia of Pregnancy  Morbid Obesity (BMI 38.26)   Daily THC use        History provided by:  Parent   used: No                        [place] Coma Scale Score: 15                     Patient History   Past Medical History:   Diagnosis Date    Asthma ([facility])     Colon cancer ([facility])     Hidradenitis suppurativa      Past Surgical History:   Procedure Laterality Date    CT ANGIO NECK  12/26/2020    CT ANGIO NECK 12/26/2020     No family history on file.  Social History     Tobacco Use    Smoking status: Never    Smokeless tobacco: Never    Tobacco comments:     Canibus occasionally   Vaping Use    Vaping status: Not on file   Substance Use Topics    Alcohol use: Yes     Alcohol/week: 1.0 standard drink of alcohol     Types: 1 Glasses of wine per week     Comment: occassional    Drug use: Yes     Frequency: 1.0 times per week     Types: Marijuana       Physical Exam   ED Triage Vitals [04/23/24  1130]   Temperature Heart Rate Respirations BP   37 °C (98.6 °F) 93 16 126/82      Pulse Ox Temp src Heart Rate Source Patient Position   97 % -- Monitor --      BP Location FiO2 (%)     -- --       Physical Exam  Vitals and nursing note reviewed. Exam conducted with a chaperone present.   Constitutional:       Appearance: She is obese.   HENT:      Head: Normocephalic.   Eyes:      Extraocular Movements: Extraocular movements intact.      Conjunctiva/sclera: Conjunctivae normal.   Cardiovascular:      Rate and Rhythm: Normal rate and regular rhythm.   Pulmonary:      Effort: Pulmonary effort is normal.      Breath sounds: Normal breath sounds.   Musculoskeletal:      Cervical back: Neck supple.   Skin:     Comments: Left axilla: Broad, 8 cm x 3 cm area of swelling that is raised from normal contour, painful to palpation, hard with some fluctuance.  Surrounding area with multiple scars from prior, healed abscesses.    Neurological:      Mental Status: She is alert.         ED Course & MDM   Diagnoses as of 04/24/24 1908   Hidradenitis suppurativa   Abscess of axilla, left   Pain in axilla, left       Medical Decision Making  I&D was performed of left axillary abscess per procedure note    Amount and/or Complexity of Data Reviewed  External Data Reviewed: labs, radiology and notes.        Procedure  Incision and Drainage    Performed by: Aiden Erwin MD  Authorized by: Aiden Erwin MD    Consent:     Consent obtained:  Verbal    Consent given by:  Patient    Risks, benefits, and alternatives were discussed: yes      Risks discussed:  Pain, infection and incomplete drainage    Alternatives discussed:  No treatment and referral  Universal protocol:     Procedure explained and questions answered to patient or proxy's satisfaction: yes      Relevant documents present and verified: no      Test results available : no      Imaging studies available: no      Required blood products, implants, devices, and special  "equipment available: no      Site/side marked: no      Immediately prior to procedure, a time out was called: yes      Patient identity confirmed:  Verbally with patient  Location:     Type:  Abscess    Location: left axilla.  Pre-procedure details:     Skin preparation:  Povidone-iodine  Sedation:     Sedation type:  None  Anesthesia:     Anesthesia method:  Local infiltration    Local anesthetic:  Lidocaine 1% w/o epi  Procedure type:     Complexity:  Complex  Procedure details:     Ultrasound guidance: no      Needle aspiration: no      Incision types:  Single straight    Wound management:  Irrigated with saline    Drainage:  Bloody and purulent    Drainage amount:  Copious    Packing materials:  1/2 in iodoform gauze    Amount 1/2\" iodoform:  3 cm  Post-procedure details:     Procedure completion:  Tolerated well, no immediate complications       Aiden Erwin MD  04/23/24 1300       Aiden Erwin MD  04/24/24 1908    "

## 2024-04-26 ENCOUNTER — TELEPHONE (OUTPATIENT)
Dept: PHARMACY | Facility: HOSPITAL | Age: 29
End: 2024-04-26
Payer: MEDICAID

## 2024-04-26 NOTE — PROGRESS NOTES
EDPD Note: Antibiotics Reviewed and Warranted    Contacted Ms. Leonardo Hardy regarding a positive abscess culture/result that was taken during their recent emergency room visit. I completed education with patient . The patient is being treated appropriately with with Augmentin 875-125mg BID x 10 days.     Patient presented to the ED for outbreaks of abscesses 2/2 the hidradenitis over the last year. Previous ED visit for an abscess of the left upper-inner thigh also grew beta-lactamase abundant mixed anaerobic bacteria which improved with Augmentin in the past and reports that it is continuing to improve now. Counseled the patient to follow-up with her provider if her symptoms persist/worsen/return.     Susceptibility data from last 90 days.  Collected Specimen Info Organism   04/23/24 Tissue/Biopsy from Skin/Superficial Abscess Mixed Anaerobic Bacteria   04/13/24 Tissue/Biopsy from Skin Lesion Mixed Anaerobic Bacteria        No further follow up needed from EDPD Team.     Terrie Narvaez, KimberlyD

## 2024-05-04 ENCOUNTER — HOSPITAL ENCOUNTER (EMERGENCY)
Facility: HOSPITAL | Age: 29
Discharge: HOME | End: 2024-05-04
Attending: EMERGENCY MEDICINE
Payer: MEDICAID

## 2024-05-04 VITALS
SYSTOLIC BLOOD PRESSURE: 127 MMHG | OXYGEN SATURATION: 100 % | BODY MASS INDEX: 37.83 KG/M2 | HEIGHT: 65 IN | RESPIRATION RATE: 16 BRPM | HEART RATE: 70 BPM | WEIGHT: 227.07 LBS | TEMPERATURE: 98.8 F | DIASTOLIC BLOOD PRESSURE: 89 MMHG

## 2024-05-04 DIAGNOSIS — L02.91 ABSCESS: Primary | ICD-10-CM

## 2024-05-04 LAB
ALBUMIN SERPL BCP-MCNC: 4 G/DL (ref 3.4–5)
ALP SERPL-CCNC: 65 U/L (ref 33–110)
ALT SERPL W P-5'-P-CCNC: 25 U/L (ref 7–45)
ANION GAP SERPL CALC-SCNC: 14 MMOL/L (ref 10–20)
AST SERPL W P-5'-P-CCNC: 13 U/L (ref 9–39)
BASOPHILS # BLD AUTO: 0.01 X10*3/UL (ref 0–0.1)
BASOPHILS NFR BLD AUTO: 0.1 %
BILIRUB SERPL-MCNC: 0.6 MG/DL (ref 0–1.2)
BUN SERPL-MCNC: 8 MG/DL (ref 6–23)
CALCIUM SERPL-MCNC: 9.3 MG/DL (ref 8.6–10.3)
CHLORIDE SERPL-SCNC: 104 MMOL/L (ref 98–107)
CO2 SERPL-SCNC: 25 MMOL/L (ref 21–32)
CREAT SERPL-MCNC: 0.81 MG/DL (ref 0.5–1.05)
EGFRCR SERPLBLD CKD-EPI 2021: >90 ML/MIN/1.73M*2
EOSINOPHIL # BLD AUTO: 0.03 X10*3/UL (ref 0–0.7)
EOSINOPHIL NFR BLD AUTO: 0.4 %
ERYTHROCYTE [DISTWIDTH] IN BLOOD BY AUTOMATED COUNT: 13.8 % (ref 11.5–14.5)
GLUCOSE SERPL-MCNC: 102 MG/DL (ref 74–99)
HCT VFR BLD AUTO: 39.4 % (ref 36–46)
HGB BLD-MCNC: 12.7 G/DL (ref 12–16)
IMM GRANULOCYTES # BLD AUTO: 0.01 X10*3/UL (ref 0–0.7)
IMM GRANULOCYTES NFR BLD AUTO: 0.1 % (ref 0–0.9)
LYMPHOCYTES # BLD AUTO: 2.53 X10*3/UL (ref 1.2–4.8)
LYMPHOCYTES NFR BLD AUTO: 34.7 %
MCH RBC QN AUTO: 31.9 PG (ref 26–34)
MCHC RBC AUTO-ENTMCNC: 32.2 G/DL (ref 32–36)
MCV RBC AUTO: 99 FL (ref 80–100)
MONOCYTES # BLD AUTO: 0.54 X10*3/UL (ref 0.1–1)
MONOCYTES NFR BLD AUTO: 7.4 %
NEUTROPHILS # BLD AUTO: 4.18 X10*3/UL (ref 1.2–7.7)
NEUTROPHILS NFR BLD AUTO: 57.3 %
NRBC BLD-RTO: ABNORMAL /100{WBCS}
PLATELET # BLD AUTO: 221 X10*3/UL (ref 150–450)
POTASSIUM SERPL-SCNC: 3.8 MMOL/L (ref 3.5–5.3)
PROT SERPL-MCNC: 7.4 G/DL (ref 6.4–8.2)
RBC # BLD AUTO: 3.98 X10*6/UL (ref 4–5.2)
SODIUM SERPL-SCNC: 139 MMOL/L (ref 136–145)
WBC # BLD AUTO: 7.3 X10*3/UL (ref 4.4–11.3)

## 2024-05-04 PROCEDURE — 85025 COMPLETE CBC W/AUTO DIFF WBC: CPT | Performed by: EMERGENCY MEDICINE

## 2024-05-04 PROCEDURE — 2500000001 HC RX 250 WO HCPCS SELF ADMINISTERED DRUGS (ALT 637 FOR MEDICARE OP): Performed by: EMERGENCY MEDICINE

## 2024-05-04 PROCEDURE — 36415 COLL VENOUS BLD VENIPUNCTURE: CPT | Performed by: EMERGENCY MEDICINE

## 2024-05-04 PROCEDURE — 99283 EMERGENCY DEPT VISIT LOW MDM: CPT

## 2024-05-04 PROCEDURE — 80053 COMPREHEN METABOLIC PANEL: CPT | Performed by: EMERGENCY MEDICINE

## 2024-05-04 RX ORDER — LINEZOLID 2 MG/ML
600 INJECTION, SOLUTION INTRAVENOUS ONCE
Status: DISCONTINUED | OUTPATIENT
Start: 2024-05-04 | End: 2024-05-04 | Stop reason: HOSPADM

## 2024-05-04 RX ORDER — LINEZOLID 600 MG/1
600 TABLET, FILM COATED ORAL 2 TIMES DAILY
Qty: 20 TABLET | Refills: 0 | Status: SHIPPED | OUTPATIENT
Start: 2024-05-04 | End: 2024-05-14

## 2024-05-04 RX ORDER — BACITRACIN ZINC 500 UNIT/G
OINTMENT IN PACKET (EA) TOPICAL ONCE
Status: COMPLETED | OUTPATIENT
Start: 2024-05-04 | End: 2024-05-04

## 2024-05-04 RX ADMIN — BACITRACIN ZINC 1 APPLICATION: 500 OINTMENT TOPICAL at 18:05

## 2024-05-04 ASSESSMENT — PAIN - FUNCTIONAL ASSESSMENT: PAIN_FUNCTIONAL_ASSESSMENT: 0-10

## 2024-05-04 ASSESSMENT — PAIN DESCRIPTION - LOCATION: LOCATION: BUTTOCKS

## 2024-05-04 ASSESSMENT — PAIN SCALES - GENERAL: PAINLEVEL_OUTOF10: 10 - WORST POSSIBLE PAIN
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
## 2024-05-04 NOTE — DISCHARGE INSTRUCTIONS
Stop the amoxicillin  Take the linezolid starting tonight  Epsom salt baths 3 times a day  Apply PRID after the bath  Keep your appointment with your dermatology

## 2024-05-27 ENCOUNTER — APPOINTMENT (OUTPATIENT)
Dept: RADIOLOGY | Facility: HOSPITAL | Age: 29
End: 2024-05-27
Payer: MEDICAID

## 2024-05-27 ENCOUNTER — HOSPITAL ENCOUNTER (EMERGENCY)
Facility: HOSPITAL | Age: 29
Discharge: HOME | End: 2024-05-27
Attending: STUDENT IN AN ORGANIZED HEALTH CARE EDUCATION/TRAINING PROGRAM
Payer: MEDICAID

## 2024-05-27 VITALS
TEMPERATURE: 99.4 F | HEIGHT: 65 IN | RESPIRATION RATE: 18 BRPM | BODY MASS INDEX: 37.82 KG/M2 | WEIGHT: 227 LBS | OXYGEN SATURATION: 100 % | HEART RATE: 96 BPM | DIASTOLIC BLOOD PRESSURE: 89 MMHG | SYSTOLIC BLOOD PRESSURE: 128 MMHG

## 2024-05-27 DIAGNOSIS — T14.8XXA MUSCLE CONTUSION: Primary | ICD-10-CM

## 2024-05-27 DIAGNOSIS — T71.193A ASSAULT BY MANUAL STRANGULATION: ICD-10-CM

## 2024-05-27 DIAGNOSIS — T74.91XA DOMESTIC VIOLENCE OF ADULT, INITIAL ENCOUNTER: ICD-10-CM

## 2024-05-27 PROCEDURE — 96375 TX/PRO/DX INJ NEW DRUG ADDON: CPT

## 2024-05-27 PROCEDURE — 2550000001 HC RX 255 CONTRASTS: Performed by: STUDENT IN AN ORGANIZED HEALTH CARE EDUCATION/TRAINING PROGRAM

## 2024-05-27 PROCEDURE — 2500000004 HC RX 250 GENERAL PHARMACY W/ HCPCS (ALT 636 FOR OP/ED)

## 2024-05-27 PROCEDURE — 99284 EMERGENCY DEPT VISIT MOD MDM: CPT | Mod: 25

## 2024-05-27 PROCEDURE — 2500000005 HC RX 250 GENERAL PHARMACY W/O HCPCS

## 2024-05-27 PROCEDURE — 96374 THER/PROPH/DIAG INJ IV PUSH: CPT

## 2024-05-27 PROCEDURE — 70498 CT ANGIOGRAPHY NECK: CPT

## 2024-05-27 PROCEDURE — 70498 CT ANGIOGRAPHY NECK: CPT | Performed by: RADIOLOGY

## 2024-05-27 RX ORDER — KETOROLAC TROMETHAMINE 30 MG/ML
15 INJECTION, SOLUTION INTRAMUSCULAR; INTRAVENOUS ONCE
Status: COMPLETED | OUTPATIENT
Start: 2024-05-27 | End: 2024-05-27

## 2024-05-27 RX ORDER — METHOCARBAMOL 100 MG/ML
1000 INJECTION, SOLUTION INTRAMUSCULAR; INTRAVENOUS ONCE
Status: COMPLETED | OUTPATIENT
Start: 2024-05-27 | End: 2024-05-27

## 2024-05-27 RX ORDER — NAPROXEN 500 MG/1
500 TABLET ORAL 2 TIMES DAILY PRN
Qty: 20 TABLET | Refills: 0 | OUTPATIENT
Start: 2024-05-27 | End: 2024-05-29

## 2024-05-27 RX ORDER — ACETAMINOPHEN 500 MG
1000 TABLET ORAL EVERY 6 HOURS PRN
Qty: 30 TABLET | Refills: 0 | OUTPATIENT
Start: 2024-05-27 | End: 2024-05-29

## 2024-05-27 RX ORDER — LIDOCAINE 560 MG/1
1 PATCH PERCUTANEOUS; TOPICAL; TRANSDERMAL DAILY
Qty: 5 PATCH | Refills: 0 | OUTPATIENT
Start: 2024-05-27 | End: 2024-05-29

## 2024-05-27 RX ORDER — LIDOCAINE 560 MG/1
2 PATCH PERCUTANEOUS; TOPICAL; TRANSDERMAL ONCE
Status: DISCONTINUED | OUTPATIENT
Start: 2024-05-27 | End: 2024-05-27 | Stop reason: HOSPADM

## 2024-05-27 RX ORDER — METHOCARBAMOL 500 MG/1
500 TABLET, FILM COATED ORAL 3 TIMES DAILY
Qty: 21 TABLET | Refills: 0 | OUTPATIENT
Start: 2024-05-27 | End: 2024-05-29

## 2024-05-27 RX ADMIN — METHOCARBAMOL 1000 MG: 100 INJECTION INTRAMUSCULAR; INTRAVENOUS at 12:21

## 2024-05-27 RX ADMIN — IOHEXOL 75 ML: 350 INJECTION, SOLUTION INTRAVENOUS at 12:04

## 2024-05-27 RX ADMIN — LIDOCAINE 4% 2 PATCH: 40 PATCH TOPICAL at 12:46

## 2024-05-27 RX ADMIN — KETOROLAC TROMETHAMINE 15 MG: 30 INJECTION, SOLUTION INTRAMUSCULAR at 12:20

## 2024-05-27 ASSESSMENT — COLUMBIA-SUICIDE SEVERITY RATING SCALE - C-SSRS
1. IN THE PAST MONTH, HAVE YOU WISHED YOU WERE DEAD OR WISHED YOU COULD GO TO SLEEP AND NOT WAKE UP?: NO
6. HAVE YOU EVER DONE ANYTHING, STARTED TO DO ANYTHING, OR PREPARED TO DO ANYTHING TO END YOUR LIFE?: NO
2. HAVE YOU ACTUALLY HAD ANY THOUGHTS OF KILLING YOURSELF?: NO

## 2024-05-27 ASSESSMENT — PAIN DESCRIPTION - PROGRESSION: CLINICAL_PROGRESSION: NOT CHANGED

## 2024-05-27 ASSESSMENT — PAIN DESCRIPTION - LOCATION: LOCATION: GENERALIZED

## 2024-05-27 ASSESSMENT — PAIN DESCRIPTION - DESCRIPTORS: DESCRIPTORS: ACHING

## 2024-05-27 ASSESSMENT — PAIN - FUNCTIONAL ASSESSMENT: PAIN_FUNCTIONAL_ASSESSMENT: 0-10

## 2024-05-27 ASSESSMENT — PAIN DESCRIPTION - PAIN TYPE: TYPE: ACUTE PAIN

## 2024-05-27 ASSESSMENT — PAIN SCALES - GENERAL: PAINLEVEL_OUTOF10: 8

## 2024-05-27 NOTE — Clinical Note
Leonardo Hardy was seen and treated in our emergency department on 5/27/2024.  She may return to work on 05/29/2024.       If you have any questions or concerns, please don't hesitate to call.      Kristine Tee, DO
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
## 2024-05-27 NOTE — SIGNIFICANT EVENT
Patient has been identified as having an emergent need for administration of iodinated contrast for CT scan prior to result of laboratory studies due to possibility of life and/or limb threatening pathology. Given patient is has no known medical pathology, will defer laboratory testing at this time.     I acknowledge the risks and benefits of emergently proceeding with contrast administration including that, at present, it is the position of the American College of Radiology that contrast induced nephropathy (SHANTELLE) is a rare but possible consequence. At this time the benefits of proceeding with contrast administration outweigh the risks.    Attempts will be made to mitigate possible SHANTELLE risk with IV fluid hydration if able.    Kristine Tee, DO  Emergency Medicine PGY-2

## 2024-05-27 NOTE — Clinical Note
Leonardo Hardy was seen and treated in our emergency department on 5/27/2024.  She may return to work on 05/30/2024.       If you have any questions or concerns, please don't hesitate to call.      Samara Fitzgerald MD
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
## 2024-05-29 ENCOUNTER — APPOINTMENT (OUTPATIENT)
Dept: RADIOLOGY | Facility: HOSPITAL | Age: 29
End: 2024-05-29
Payer: MEDICAID

## 2024-05-29 ENCOUNTER — HOSPITAL ENCOUNTER (EMERGENCY)
Facility: HOSPITAL | Age: 29
Discharge: HOME | End: 2024-05-29
Attending: EMERGENCY MEDICINE
Payer: MEDICAID

## 2024-05-29 VITALS
BODY MASS INDEX: 37.49 KG/M2 | RESPIRATION RATE: 18 BRPM | HEIGHT: 65 IN | HEART RATE: 66 BPM | OXYGEN SATURATION: 100 % | SYSTOLIC BLOOD PRESSURE: 137 MMHG | WEIGHT: 225 LBS | DIASTOLIC BLOOD PRESSURE: 92 MMHG | TEMPERATURE: 98.1 F

## 2024-05-29 DIAGNOSIS — S22.080A COMPRESSION FRACTURE OF T12 VERTEBRA, INITIAL ENCOUNTER (MULTI): Primary | ICD-10-CM

## 2024-05-29 LAB
APPEARANCE UR: ABNORMAL
BACTERIA #/AREA URNS AUTO: ABNORMAL /HPF
BILIRUB UR STRIP.AUTO-MCNC: NEGATIVE MG/DL
COLOR UR: YELLOW
GLUCOSE UR STRIP.AUTO-MCNC: NORMAL MG/DL
HCG UR QL IA.RAPID: NEGATIVE
KETONES UR STRIP.AUTO-MCNC: ABNORMAL MG/DL
LEUKOCYTE ESTERASE UR QL STRIP.AUTO: ABNORMAL
MUCOUS THREADS #/AREA URNS AUTO: ABNORMAL /LPF
NITRITE UR QL STRIP.AUTO: NEGATIVE
PH UR STRIP.AUTO: 6 [PH]
PROT UR STRIP.AUTO-MCNC: ABNORMAL MG/DL
RBC # UR STRIP.AUTO: NEGATIVE /UL
RBC #/AREA URNS AUTO: ABNORMAL /HPF
SP GR UR STRIP.AUTO: 1.04
SQUAMOUS #/AREA URNS AUTO: ABNORMAL /HPF
UROBILINOGEN UR STRIP.AUTO-MCNC: ABNORMAL MG/DL
WBC #/AREA URNS AUTO: ABNORMAL /HPF

## 2024-05-29 PROCEDURE — 2500000005 HC RX 250 GENERAL PHARMACY W/O HCPCS

## 2024-05-29 PROCEDURE — 72128 CT CHEST SPINE W/O DYE: CPT | Mod: FOREIGN READ | Performed by: RADIOLOGY

## 2024-05-29 PROCEDURE — 72072 X-RAY EXAM THORAC SPINE 3VWS: CPT

## 2024-05-29 PROCEDURE — 2500000004 HC RX 250 GENERAL PHARMACY W/ HCPCS (ALT 636 FOR OP/ED): Performed by: EMERGENCY MEDICINE

## 2024-05-29 PROCEDURE — 2500000001 HC RX 250 WO HCPCS SELF ADMINISTERED DRUGS (ALT 637 FOR MEDICARE OP): Performed by: EMERGENCY MEDICINE

## 2024-05-29 PROCEDURE — 99285 EMERGENCY DEPT VISIT HI MDM: CPT | Mod: 25

## 2024-05-29 PROCEDURE — 81025 URINE PREGNANCY TEST: CPT

## 2024-05-29 PROCEDURE — 81001 URINALYSIS AUTO W/SCOPE: CPT

## 2024-05-29 PROCEDURE — 96372 THER/PROPH/DIAG INJ SC/IM: CPT | Performed by: EMERGENCY MEDICINE

## 2024-05-29 PROCEDURE — 72128 CT CHEST SPINE W/O DYE: CPT

## 2024-05-29 PROCEDURE — 72070 X-RAY EXAM THORAC SPINE 2VWS: CPT

## 2024-05-29 PROCEDURE — 72131 CT LUMBAR SPINE W/O DYE: CPT | Mod: FOREIGN READ | Performed by: RADIOLOGY

## 2024-05-29 PROCEDURE — 72131 CT LUMBAR SPINE W/O DYE: CPT

## 2024-05-29 PROCEDURE — 72070 X-RAY EXAM THORAC SPINE 2VWS: CPT | Mod: FOREIGN READ | Performed by: RADIOLOGY

## 2024-05-29 RX ORDER — KETOROLAC TROMETHAMINE 30 MG/ML
30 INJECTION, SOLUTION INTRAMUSCULAR; INTRAVENOUS ONCE
Status: COMPLETED | OUTPATIENT
Start: 2024-05-29 | End: 2024-05-29

## 2024-05-29 RX ORDER — NAPROXEN 500 MG/1
500 TABLET ORAL EVERY 12 HOURS
Qty: 6 TABLET | Refills: 0 | Status: SHIPPED | OUTPATIENT
Start: 2024-05-29 | End: 2024-06-01

## 2024-05-29 RX ORDER — OXYCODONE HYDROCHLORIDE 5 MG/1
5 TABLET ORAL ONCE AS NEEDED
Status: COMPLETED | OUTPATIENT
Start: 2024-05-29 | End: 2024-05-29

## 2024-05-29 RX ORDER — ACETAMINOPHEN 325 MG/1
975 TABLET ORAL ONCE
Status: COMPLETED | OUTPATIENT
Start: 2024-05-29 | End: 2024-05-29

## 2024-05-29 RX ORDER — LIDOCAINE 50 MG/G
1 PATCH TOPICAL DAILY
Qty: 5 PATCH | Refills: 0 | Status: SHIPPED | OUTPATIENT
Start: 2024-05-29 | End: 2024-06-03

## 2024-05-29 RX ORDER — TRAMADOL HYDROCHLORIDE 50 MG/1
50 TABLET ORAL EVERY 8 HOURS PRN
Qty: 9 TABLET | Refills: 0 | Status: SHIPPED | OUTPATIENT
Start: 2024-05-29 | End: 2024-06-01

## 2024-05-29 RX ORDER — ACETAMINOPHEN 500 MG
1000 TABLET ORAL EVERY 8 HOURS PRN
Qty: 18 TABLET | Refills: 0 | Status: SHIPPED | OUTPATIENT
Start: 2024-05-29 | End: 2024-06-01

## 2024-05-29 RX ORDER — LIDOCAINE 560 MG/1
1 PATCH PERCUTANEOUS; TOPICAL; TRANSDERMAL DAILY
Status: DISCONTINUED | OUTPATIENT
Start: 2024-05-29 | End: 2024-05-29 | Stop reason: HOSPADM

## 2024-05-29 RX ADMIN — OXYCODONE HYDROCHLORIDE 5 MG: 5 TABLET ORAL at 17:02

## 2024-05-29 RX ADMIN — ACETAMINOPHEN 975 MG: 325 TABLET ORAL at 13:45

## 2024-05-29 RX ADMIN — KETOROLAC TROMETHAMINE 30 MG: 30 INJECTION, SOLUTION INTRAMUSCULAR at 17:02

## 2024-05-29 RX ADMIN — LIDOCAINE 1 PATCH: 4 PATCH TOPICAL at 13:45

## 2024-05-29 ASSESSMENT — PAIN - FUNCTIONAL ASSESSMENT
PAIN_FUNCTIONAL_ASSESSMENT: 0-10
PAIN_FUNCTIONAL_ASSESSMENT: 0-10

## 2024-05-29 ASSESSMENT — PAIN SCALES - GENERAL
PAINLEVEL_OUTOF10: 5 - MODERATE PAIN
PAINLEVEL_OUTOF10: 10 - WORST POSSIBLE PAIN

## 2024-05-29 ASSESSMENT — PAIN DESCRIPTION - LOCATION
LOCATION: BACK
LOCATION: BACK

## 2024-05-29 ASSESSMENT — PAIN DESCRIPTION - PROGRESSION: CLINICAL_PROGRESSION: NOT CHANGED

## 2024-05-29 NOTE — Clinical Note
Leonardo Hardy was seen and treated in our emergency department on 5/29/2024.  She may return to work on 06/03/2024.       If you have any questions or concerns, please don't hesitate to call.      Reji Meyer PA-C

## 2024-05-29 NOTE — ED NOTES
Community Resource Name: List of  primary care physicians  Phone Number:   Staff Member:  Hui Dickson    Discussed the following topics on behalf of the patient:  []  Behavioral Health Assistance     []  Case Management  []   Assistance  []  Digital Equity Assistance  []  Dental Health Assistance  []  Education Assistance  []  Employment Assistance  []  Financial Strain Relief Assistance  []  Food Insecurity Assistance  [x]  Healthcare Coverage Assistance  []  Housing Stability Assistance  []  IP Violence Relief Assistance  []  Legal Assistance  []  Physical Activity Assistance  []  Social Connection Assistance  []  Stress Relief Assistance   []  Substance Abuse Assistance  []  Transportation Assistance  []  Utility Assistance  [x]  Other: [insert comment here]  No primary care physician.  Next Steps:         DALI Velasquez  CHW talked to patient regarding not having a primary care physician. CHW asked the patient if she would be interested in looking at a list of  physicians to choose from for future services. Patient agreed and was given a list of  physicians, they are accepting new patients, they are taking her insurance([facility] plan), and they are in the area where she lives. Patient expressed appreciation.        DALI Velasquez  05/29/24 1179

## 2024-05-29 NOTE — DISCHARGE INSTRUCTIONS
Please return to the ED immediately if you have any new or worsening signs or symptoms  Please follow with your primary care doctor within 3 days  Please return to the ED immediately if you begin to experience difficulty controlling her bowel or bladder, numbness in the genital/urinary, or any other new or worsening signs or symptoms
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
## 2024-06-06 ENCOUNTER — APPOINTMENT (OUTPATIENT)
Dept: ORTHOPEDIC SURGERY | Facility: CLINIC | Age: 29
End: 2024-06-06
Payer: MEDICAID

## 2024-06-06 ENCOUNTER — APPOINTMENT (OUTPATIENT)
Dept: PRIMARY CARE | Facility: CLINIC | Age: 29
End: 2024-06-06
Payer: MEDICAID

## 2024-06-13 ENCOUNTER — HOSPITAL ENCOUNTER (EMERGENCY)
Facility: HOSPITAL | Age: 29
Discharge: HOME | End: 2024-06-13
Attending: EMERGENCY MEDICINE
Payer: MEDICAID

## 2024-06-13 VITALS
HEIGHT: 65 IN | RESPIRATION RATE: 16 BRPM | OXYGEN SATURATION: 98 % | WEIGHT: 222.66 LBS | DIASTOLIC BLOOD PRESSURE: 76 MMHG | HEART RATE: 64 BPM | SYSTOLIC BLOOD PRESSURE: 118 MMHG | BODY MASS INDEX: 37.1 KG/M2 | TEMPERATURE: 98.6 F

## 2024-06-13 DIAGNOSIS — L02.91 ABSCESS: Primary | ICD-10-CM

## 2024-06-13 LAB
ALBUMIN SERPL BCP-MCNC: 3.8 G/DL (ref 3.4–5)
ALP SERPL-CCNC: 61 U/L (ref 33–110)
ALT SERPL W P-5'-P-CCNC: 24 U/L (ref 7–45)
ANION GAP SERPL CALC-SCNC: 11 MMOL/L (ref 10–20)
AST SERPL W P-5'-P-CCNC: 19 U/L (ref 9–39)
BASOPHILS # BLD AUTO: 0.02 X10*3/UL (ref 0–0.1)
BASOPHILS NFR BLD AUTO: 0.3 %
BILIRUB SERPL-MCNC: 0.9 MG/DL (ref 0–1.2)
BUN SERPL-MCNC: 12 MG/DL (ref 6–23)
CALCIUM SERPL-MCNC: 9 MG/DL (ref 8.6–10.3)
CHLORIDE SERPL-SCNC: 107 MMOL/L (ref 98–107)
CO2 SERPL-SCNC: 25 MMOL/L (ref 21–32)
CREAT SERPL-MCNC: 0.73 MG/DL (ref 0.5–1.05)
EGFRCR SERPLBLD CKD-EPI 2021: >90 ML/MIN/1.73M*2
EOSINOPHIL # BLD AUTO: 0.04 X10*3/UL (ref 0–0.7)
EOSINOPHIL NFR BLD AUTO: 0.7 %
ERYTHROCYTE [DISTWIDTH] IN BLOOD BY AUTOMATED COUNT: 13.1 % (ref 11.5–14.5)
GLUCOSE SERPL-MCNC: 94 MG/DL (ref 74–99)
HCT VFR BLD AUTO: 38.9 % (ref 36–46)
HGB BLD-MCNC: 12.6 G/DL (ref 12–16)
IMM GRANULOCYTES # BLD AUTO: 0.01 X10*3/UL (ref 0–0.7)
IMM GRANULOCYTES NFR BLD AUTO: 0.2 % (ref 0–0.9)
LYMPHOCYTES # BLD AUTO: 1.86 X10*3/UL (ref 1.2–4.8)
LYMPHOCYTES NFR BLD AUTO: 32.1 %
MCH RBC QN AUTO: 32 PG (ref 26–34)
MCHC RBC AUTO-ENTMCNC: 32.4 G/DL (ref 32–36)
MCV RBC AUTO: 99 FL (ref 80–100)
MONOCYTES # BLD AUTO: 0.51 X10*3/UL (ref 0.1–1)
MONOCYTES NFR BLD AUTO: 8.8 %
NEUTROPHILS # BLD AUTO: 3.36 X10*3/UL (ref 1.2–7.7)
NEUTROPHILS NFR BLD AUTO: 57.9 %
NRBC BLD-RTO: ABNORMAL /100{WBCS}
PLATELET # BLD AUTO: 191 X10*3/UL (ref 150–450)
POTASSIUM SERPL-SCNC: 4.3 MMOL/L (ref 3.5–5.3)
PROT SERPL-MCNC: 6.9 G/DL (ref 6.4–8.2)
RBC # BLD AUTO: 3.94 X10*6/UL (ref 4–5.2)
SODIUM SERPL-SCNC: 139 MMOL/L (ref 136–145)
WBC # BLD AUTO: 5.8 X10*3/UL (ref 4.4–11.3)

## 2024-06-13 PROCEDURE — 96375 TX/PRO/DX INJ NEW DRUG ADDON: CPT

## 2024-06-13 PROCEDURE — 99284 EMERGENCY DEPT VISIT MOD MDM: CPT

## 2024-06-13 PROCEDURE — 36415 COLL VENOUS BLD VENIPUNCTURE: CPT | Performed by: EMERGENCY MEDICINE

## 2024-06-13 PROCEDURE — 2500000004 HC RX 250 GENERAL PHARMACY W/ HCPCS (ALT 636 FOR OP/ED): Performed by: EMERGENCY MEDICINE

## 2024-06-13 PROCEDURE — C9113 INJ PANTOPRAZOLE SODIUM, VIA: HCPCS | Performed by: EMERGENCY MEDICINE

## 2024-06-13 PROCEDURE — 96365 THER/PROPH/DIAG IV INF INIT: CPT

## 2024-06-13 PROCEDURE — 2500000001 HC RX 250 WO HCPCS SELF ADMINISTERED DRUGS (ALT 637 FOR MEDICARE OP): Performed by: EMERGENCY MEDICINE

## 2024-06-13 PROCEDURE — 85025 COMPLETE CBC W/AUTO DIFF WBC: CPT | Performed by: EMERGENCY MEDICINE

## 2024-06-13 PROCEDURE — 84075 ASSAY ALKALINE PHOSPHATASE: CPT | Performed by: EMERGENCY MEDICINE

## 2024-06-13 RX ORDER — ONDANSETRON HYDROCHLORIDE 2 MG/ML
4 INJECTION, SOLUTION INTRAVENOUS ONCE
Status: COMPLETED | OUTPATIENT
Start: 2024-06-13 | End: 2024-06-13

## 2024-06-13 RX ORDER — IBUPROFEN 600 MG/1
600 TABLET ORAL ONCE
Status: COMPLETED | OUTPATIENT
Start: 2024-06-13 | End: 2024-06-13

## 2024-06-13 RX ORDER — ACETAMINOPHEN 325 MG/1
650 TABLET ORAL ONCE
Status: COMPLETED | OUTPATIENT
Start: 2024-06-13 | End: 2024-06-13

## 2024-06-13 RX ORDER — PANTOPRAZOLE SODIUM 40 MG/10ML
40 INJECTION, POWDER, LYOPHILIZED, FOR SOLUTION INTRAVENOUS ONCE
Status: COMPLETED | OUTPATIENT
Start: 2024-06-13 | End: 2024-06-13

## 2024-06-13 RX ORDER — AMOXICILLIN AND CLAVULANATE POTASSIUM 500; 125 MG/1; MG/1
1 TABLET, FILM COATED ORAL EVERY 8 HOURS
Qty: 21 TABLET | Refills: 0 | Status: SHIPPED | OUTPATIENT
Start: 2024-06-13 | End: 2024-06-20

## 2024-06-13 RX ORDER — CEFTRIAXONE 1 G/50ML
1 INJECTION, SOLUTION INTRAVENOUS ONCE
Status: COMPLETED | OUTPATIENT
Start: 2024-06-13 | End: 2024-06-13

## 2024-06-13 RX ADMIN — IBUPROFEN 600 MG: 600 TABLET, FILM COATED ORAL at 09:02

## 2024-06-13 RX ADMIN — PANTOPRAZOLE SODIUM 40 MG: 40 INJECTION, POWDER, FOR SOLUTION INTRAVENOUS at 09:37

## 2024-06-13 RX ADMIN — CEFTRIAXONE 1 G: 1 INJECTION, SOLUTION INTRAVENOUS at 09:38

## 2024-06-13 RX ADMIN — ACETAMINOPHEN 650 MG: 325 TABLET ORAL at 09:02

## 2024-06-13 RX ADMIN — ONDANSETRON 4 MG: 2 INJECTION INTRAMUSCULAR; INTRAVENOUS at 09:37

## 2024-06-13 ASSESSMENT — PAIN SCALES - GENERAL
PAINLEVEL_OUTOF10: 6
PAINLEVEL_OUTOF10: 4
PAINLEVEL_OUTOF10: 10 - WORST POSSIBLE PAIN
PAINLEVEL_OUTOF10: 4

## 2024-06-13 ASSESSMENT — PAIN DESCRIPTION - ORIENTATION: ORIENTATION: LEFT

## 2024-06-13 ASSESSMENT — PAIN DESCRIPTION - PROGRESSION: CLINICAL_PROGRESSION: NOT CHANGED

## 2024-06-13 ASSESSMENT — PAIN DESCRIPTION - PAIN TYPE: TYPE: ACUTE PAIN

## 2024-06-13 ASSESSMENT — PAIN DESCRIPTION - DESCRIPTORS: DESCRIPTORS: ACHING;SHARP

## 2024-06-13 ASSESSMENT — PAIN DESCRIPTION - LOCATION: LOCATION: BREAST

## 2024-06-13 ASSESSMENT — PAIN - FUNCTIONAL ASSESSMENT: PAIN_FUNCTIONAL_ASSESSMENT: 0-10

## 2024-06-13 ASSESSMENT — PAIN DESCRIPTION - ONSET: ONSET: ONGOING

## 2024-06-13 ASSESSMENT — PAIN DESCRIPTION - FREQUENCY: FREQUENCY: CONSTANT/CONTINUOUS

## 2024-06-13 NOTE — ED TRIAGE NOTES
Patient stated has an abscess under left breast for 3 days, very painful.   Hx Hidradenitis Suppuratis

## 2024-06-13 NOTE — DISCHARGE INSTRUCTIONS
Take medication as directed  Follow-up with your PCP  Be sure to put warm compresses on your breast 20 minutes 3 times a day and then apply PRID to help draw the infection out  
back pain general

## 2024-06-13 NOTE — ED PROVIDER NOTES
HPI   Chief Complaint   Patient presents with   • Abscess     Abscess under left breast for 3 days, very painful.       HPI  Patient has a history of recurrent abscesses.  Comes today complaining of an abscess under her left breast for the last 3 days she has had no fever chills or night sweats; denies any other abscesses; she has a history of hidadenitis suppurativa                  [place] Coma Scale Score: 15                     Patient History   Past Medical History:   Diagnosis Date   • Asthma ([facility])    • Colon cancer (Multi)    • Hidradenitis suppurativa    • Recurrent genital herpes      Past Surgical History:   Procedure Laterality Date   • CT ANGIO NECK  12/26/2020    CT ANGIO NECK 12/26/2020     No family history on file.  Social History     Tobacco Use   • Smoking status: Never   • Smokeless tobacco: Never   • Tobacco comments:     Canibus occasionally   Vaping Use   • Vaping status: Never Used   Substance Use Topics   • Alcohol use: Yes     Alcohol/week: 1.0 standard drink of alcohol     Types: 1 Glasses of wine per week     Comment: occassional   • Drug use: Yes     Frequency: 1.0 times per week     Types: Marijuana     Comment: Last used 2 days ago       Physical Exam   ED Triage Vitals [06/13/24 0819]   Temperature Heart Rate Respirations BP   37 °C (98.6 °F) 71 20 131/80      Pulse Ox Temp Source Heart Rate Source Patient Position   98 % Temporal Monitor Sitting      BP Location FiO2 (%)     Left arm --       Physical Exam  Patient is alert in no acute distress  Left breast:  There is a small tender area at 6:00; palpation is tender however no discrete abscess is appreciated  Mild cellulitic changes at site  No lymphadenopathy  ED Course & MDM   Diagnoses as of 06/13/24 0939   Abscess       Medical Decision Making  Clinical exam is consistent with a developing abscess however there is nothing to I&D; she has some mild cellulitic changes will give IV ceftriaxone; at discharge Augmentin 500 for 7  days as well as using PRID to draw out the abscess  Follow-up PCP    Procedure  Procedures     Janelle Guerra MD  06/13/24 0610

## 2024-06-20 ENCOUNTER — APPOINTMENT (OUTPATIENT)
Dept: ORTHOPEDIC SURGERY | Facility: CLINIC | Age: 29
End: 2024-06-20
Payer: MEDICAID

## 2024-06-20 ENCOUNTER — APPOINTMENT (OUTPATIENT)
Dept: PRIMARY CARE | Facility: CLINIC | Age: 29
End: 2024-06-20
Payer: MEDICAID

## 2024-06-20 ENCOUNTER — LAB (OUTPATIENT)
Dept: LAB | Facility: LAB | Age: 29
End: 2024-06-20
Payer: MEDICAID

## 2024-06-20 VITALS
BODY MASS INDEX: 37.49 KG/M2 | DIASTOLIC BLOOD PRESSURE: 80 MMHG | WEIGHT: 225 LBS | SYSTOLIC BLOOD PRESSURE: 140 MMHG | HEIGHT: 65 IN

## 2024-06-20 DIAGNOSIS — R80.9 PROTEINURIA, UNSPECIFIED TYPE: ICD-10-CM

## 2024-06-20 DIAGNOSIS — B00.9 HSV (HERPES SIMPLEX VIRUS) INFECTION: ICD-10-CM

## 2024-06-20 DIAGNOSIS — Z00.00 HEALTH MAINTENANCE EXAMINATION: Primary | ICD-10-CM

## 2024-06-20 DIAGNOSIS — J30.89 NON-SEASONAL ALLERGIC RHINITIS, UNSPECIFIED TRIGGER: ICD-10-CM

## 2024-06-20 PROBLEM — S43.409A SPRAIN OF SHOULDER: Status: RESOLVED | Noted: 2024-06-20 | Resolved: 2024-06-20

## 2024-06-20 PROBLEM — O99.013 ANEMIA AFFECTING PREGNANCY IN THIRD TRIMESTER (HHS-HCC): Status: RESOLVED | Noted: 2019-04-08 | Resolved: 2024-06-20

## 2024-06-20 PROBLEM — O99.213 OBESITY AFFECTING PREGNANCY IN THIRD TRIMESTER (HHS-HCC): Status: RESOLVED | Noted: 2019-03-04 | Resolved: 2024-06-20

## 2024-06-20 PROBLEM — B34.9 VIRAL DISEASE: Status: RESOLVED | Noted: 2023-10-18 | Resolved: 2024-06-20

## 2024-06-20 PROBLEM — S43.409A SPRAIN OF SHOULDER: Status: ACTIVE | Noted: 2024-06-20

## 2024-06-20 PROBLEM — A59.01 TRICHOMONAL VAGINITIS DURING PREGNANCY IN FIRST TRIMESTER (HHS-HCC): Status: RESOLVED | Noted: 2018-10-25 | Resolved: 2024-06-20

## 2024-06-20 PROBLEM — O43.122: Status: RESOLVED | Noted: 2019-02-05 | Resolved: 2024-06-20

## 2024-06-20 PROBLEM — M79.629 PAIN IN AXILLA: Status: RESOLVED | Noted: 2024-06-20 | Resolved: 2024-06-20

## 2024-06-20 PROBLEM — L02.214 ABSCESS OF GROIN, RIGHT: Status: RESOLVED | Noted: 2023-10-03 | Resolved: 2024-06-20

## 2024-06-20 PROBLEM — R42 DIZZINESS AND GIDDINESS: Status: ACTIVE | Noted: 2022-10-17

## 2024-06-20 PROBLEM — E66.01 SEVERE OBESITY (MULTI): Status: RESOLVED | Noted: 2023-05-28 | Resolved: 2024-06-20

## 2024-06-20 PROBLEM — M79.629 PAIN IN AXILLA: Status: ACTIVE | Noted: 2024-06-20

## 2024-06-20 PROBLEM — J20.9 BRONCHITIS WITH BRONCHOSPASM: Status: RESOLVED | Noted: 2023-10-18 | Resolved: 2024-06-20

## 2024-06-20 PROBLEM — E87.6 HYPOKALEMIA: Status: ACTIVE | Noted: 2024-06-20

## 2024-06-20 PROBLEM — O23.591 TRICHOMONAL VAGINITIS DURING PREGNANCY IN FIRST TRIMESTER (HHS-HCC): Status: RESOLVED | Noted: 2018-10-25 | Resolved: 2024-06-20

## 2024-06-20 PROBLEM — E87.6 HYPOKALEMIA: Status: RESOLVED | Noted: 2024-06-20 | Resolved: 2024-06-20

## 2024-06-20 PROBLEM — E66.01 SEVERE OBESITY (MULTI): Status: ACTIVE | Noted: 2023-05-28

## 2024-06-20 PROBLEM — R42 DIZZINESS AND GIDDINESS: Status: RESOLVED | Noted: 2022-10-17 | Resolved: 2024-06-20

## 2024-06-20 LAB
APPEARANCE UR: CLEAR
BILIRUB UR STRIP.AUTO-MCNC: NEGATIVE MG/DL
COLOR UR: NORMAL
GLUCOSE UR STRIP.AUTO-MCNC: NORMAL MG/DL
KETONES UR STRIP.AUTO-MCNC: NEGATIVE MG/DL
LEUKOCYTE ESTERASE UR QL STRIP.AUTO: NEGATIVE
NITRITE UR QL STRIP.AUTO: NEGATIVE
PH UR STRIP.AUTO: 6 [PH]
PROT UR STRIP.AUTO-MCNC: NEGATIVE MG/DL
RBC # UR STRIP.AUTO: NEGATIVE /UL
SP GR UR STRIP.AUTO: 1.01
UROBILINOGEN UR STRIP.AUTO-MCNC: NORMAL MG/DL

## 2024-06-20 PROCEDURE — 81003 URINALYSIS AUTO W/O SCOPE: CPT

## 2024-06-20 PROCEDURE — 99385 PREV VISIT NEW AGE 18-39: CPT

## 2024-06-20 PROCEDURE — 1036F TOBACCO NON-USER: CPT

## 2024-06-20 PROCEDURE — 3008F BODY MASS INDEX DOCD: CPT

## 2024-06-20 RX ORDER — FLUTICASONE PROPIONATE 50 MCG
2 SPRAY, SUSPENSION (ML) NASAL DAILY
Qty: 16 G | Refills: 1 | Status: SHIPPED | OUTPATIENT
Start: 2024-06-20

## 2024-06-20 RX ORDER — TALC
3 POWDER (GRAM) TOPICAL
COMMUNITY
Start: 2023-10-04 | End: 2024-06-20 | Stop reason: WASHOUT

## 2024-06-20 RX ORDER — VALACYCLOVIR HYDROCHLORIDE 1 G/1
1000 TABLET, FILM COATED ORAL DAILY
Qty: 90 TABLET | Refills: 1 | Status: SHIPPED | OUTPATIENT
Start: 2024-06-20 | End: 2024-12-17

## 2024-06-20 ASSESSMENT — PATIENT HEALTH QUESTIONNAIRE - PHQ9
1. LITTLE INTEREST OR PLEASURE IN DOING THINGS: NOT AT ALL
SUM OF ALL RESPONSES TO PHQ9 QUESTIONS 1 AND 2: 0
2. FEELING DOWN, DEPRESSED OR HOPELESS: NOT AT ALL

## 2024-06-20 ASSESSMENT — LIFESTYLE VARIABLES
HOW OFTEN DO YOU HAVE A DRINK CONTAINING ALCOHOL: MONTHLY OR LESS
HOW OFTEN DO YOU HAVE SIX OR MORE DRINKS ON ONE OCCASION: NEVER

## 2024-06-20 NOTE — PROGRESS NOTES
"Subjective   Patient ID: Leonardo Hardy is a 28 y.o. female who presents for Clinic new est.  HPI  28 year old female with PMH of hydradenitis suppurativa presents today to establish care.   PMH: hidradenitis suppurativa, colon cancer at age 15 --> resolves with surgery, now goes for colonoscopy Q3 year. Did see genetics at that time, no gene identified. Will be due for colonoscopy in 2026   PSH: tubal ligation  PFH: Adopted     Diet: Been working on balanced diet   Exercise: walking   Nicotine: none  ETOH: intermittent   Drug use: none  Dental care: UTD  Vision concerns: Contacts, vision stable   Hearing concerns: none    Two daughters ages 5 and 8. Works as medical assistant at  [employer].     All systems have been reviewed and are negative for complaint other than those mentioned in the HPI.     /80 (BP Location: Left arm, Patient Position: Sitting, BP Cuff Size: Large adult)   Ht 1.651 m (5' 5\")   Wt 102 kg (225 lb)   LMP 05/08/2024   BMI 37.44 kg/m²    Objective   Physical Exam  Constitutional:       General: She is awake.      Appearance: Normal appearance.   HENT:      Head: Normocephalic and atraumatic.   Eyes:      Extraocular Movements: Extraocular movements intact.      Pupils: Pupils are equal, round, and reactive to light.   Cardiovascular:      Rate and Rhythm: Normal rate and regular rhythm.      Heart sounds: S1 normal and S2 normal. No murmur heard.  Pulmonary:      Effort: Pulmonary effort is normal.      Breath sounds: Normal breath sounds.   Musculoskeletal:      Cervical back: Normal range of motion and neck supple.      Right lower leg: No edema.      Left lower leg: No edema.   Skin:     General: Skin is warm and dry.   Neurological:      General: No focal deficit present.      Mental Status: She is alert and oriented to person, place, and time.   Psychiatric:         Mood and Affect: Mood and affect normal.         Behavior: Behavior normal. Behavior is cooperative.    "      Thought Content: Thought content normal.         Judgment: Judgment normal.       Leonardo was seen today for clinic new est.  Diagnoses and all orders for this visit:  Health maintenance examination (Primary)   - HM labs UTD   - Pap UTD, due 2026   - Immunizations UTD   - Discussed healthy diet and exercise   - Next colonoscopy in 2 years.   HSV (herpes simplex virus) infection  -     Stable, continue current meds   - valACYclovir (Valtrex) 1 gram tablet; Take 1 tablet (1,000 mg) by mouth once daily.  Non-seasonal allergic rhinitis, unspecified trigger  -    Stable, continue current meds  -  fluticasone (Flonase) 50 mcg/actuation nasal spray; Administer 2 sprays into each nostril once daily.  Proteinuria, unspecified type  -     Proteinuria noted in ED, will recheck.   - Urinalysis with Reflex Microscopic; Future    Follow up for annual physical or PRN

## 2024-06-20 NOTE — PATIENT INSTRUCTIONS
It was nice to meet you today.   Please stop at the lab on the first floor on the way out to give your urine sample.     Two options of hydradenitis suppurativa:   Dr. Rob: [phone]  Myla Sung: [phone]

## 2024-07-05 ENCOUNTER — HOSPITAL ENCOUNTER (EMERGENCY)
Facility: HOSPITAL | Age: 29
Discharge: HOME | End: 2024-07-05
Attending: EMERGENCY MEDICINE
Payer: MEDICAID

## 2024-07-05 VITALS
RESPIRATION RATE: 17 BRPM | OXYGEN SATURATION: 97 % | HEART RATE: 83 BPM | BODY MASS INDEX: 37.49 KG/M2 | WEIGHT: 225 LBS | HEIGHT: 65 IN | SYSTOLIC BLOOD PRESSURE: 130 MMHG | TEMPERATURE: 97 F | DIASTOLIC BLOOD PRESSURE: 85 MMHG

## 2024-07-05 DIAGNOSIS — M79.622 PAIN IN LEFT AXILLA: ICD-10-CM

## 2024-07-05 DIAGNOSIS — Z87.2 HISTORY OF HIDRADENITIS SUPPURATIVA: Primary | ICD-10-CM

## 2024-07-05 DIAGNOSIS — R03.0 ELEVATED BLOOD PRESSURE READING: ICD-10-CM

## 2024-07-05 PROCEDURE — 2500000004 HC RX 250 GENERAL PHARMACY W/ HCPCS (ALT 636 FOR OP/ED)

## 2024-07-05 PROCEDURE — 87186 SC STD MICRODIL/AGAR DIL: CPT | Mod: AHULAB

## 2024-07-05 PROCEDURE — 96372 THER/PROPH/DIAG INJ SC/IM: CPT

## 2024-07-05 PROCEDURE — 99283 EMERGENCY DEPT VISIT LOW MDM: CPT

## 2024-07-05 PROCEDURE — 2500000001 HC RX 250 WO HCPCS SELF ADMINISTERED DRUGS (ALT 637 FOR MEDICARE OP)

## 2024-07-05 RX ORDER — CLINDAMYCIN HYDROCHLORIDE 150 MG/1
450 CAPSULE ORAL ONCE
Status: DISCONTINUED | OUTPATIENT
Start: 2024-07-05 | End: 2024-07-05

## 2024-07-05 RX ORDER — CHLORHEXIDINE GLUCONATE 4 %
1 LIQUID (ML) TOPICAL 3 TIMES WEEKLY
COMMUNITY

## 2024-07-05 RX ORDER — TRAMADOL HYDROCHLORIDE 50 MG/1
50 TABLET ORAL EVERY 8 HOURS PRN
Qty: 6 TABLET | Refills: 0 | Status: SHIPPED | OUTPATIENT
Start: 2024-07-05 | End: 2024-07-07

## 2024-07-05 RX ORDER — AMOXICILLIN AND CLAVULANATE POTASSIUM 875; 125 MG/1; MG/1
1 TABLET, FILM COATED ORAL ONCE
Status: COMPLETED | OUTPATIENT
Start: 2024-07-05 | End: 2024-07-05

## 2024-07-05 RX ORDER — AMOXICILLIN AND CLAVULANATE POTASSIUM 875; 125 MG/1; MG/1
875 TABLET, FILM COATED ORAL 2 TIMES DAILY
COMMUNITY
Start: 2024-07-03

## 2024-07-05 RX ORDER — KETOROLAC TROMETHAMINE 30 MG/ML
15 INJECTION, SOLUTION INTRAMUSCULAR; INTRAVENOUS ONCE
Status: COMPLETED | OUTPATIENT
Start: 2024-07-05 | End: 2024-07-05

## 2024-07-05 RX ORDER — ACETAMINOPHEN 325 MG/1
975 TABLET ORAL ONCE
Status: COMPLETED | OUTPATIENT
Start: 2024-07-05 | End: 2024-07-05

## 2024-07-05 RX ORDER — BENZOYL PEROXIDE 50 MG/ML
1 LIQUID TOPICAL EVERY OTHER DAY
COMMUNITY
Start: 2024-06-28

## 2024-07-05 RX ORDER — CLINDAMYCIN PHOSPHATE 10 UG/ML
1 LOTION TOPICAL DAILY
COMMUNITY

## 2024-07-05 RX ORDER — SPIRONOLACTONE 50 MG/1
50 TABLET, FILM COATED ORAL 2 TIMES DAILY
COMMUNITY
Start: 2024-06-27

## 2024-07-05 ASSESSMENT — PAIN SCALES - GENERAL: PAINLEVEL_OUTOF10: 10 - WORST POSSIBLE PAIN

## 2024-07-05 ASSESSMENT — PAIN - FUNCTIONAL ASSESSMENT: PAIN_FUNCTIONAL_ASSESSMENT: 0-10

## 2024-07-05 NOTE — ED TRIAGE NOTES
Pt to ED for an abscess under her left arm. Abscess was lanced 2 days ago at [facility]. No culture was obtained. Area around abscess is very red, painful, and swollen. Pt states the area had spread quite a bit in the last two days, and is now having difficulty completing day to day tasks due to the pain. Pt awake and alert, and ambulatory upon arrival. Vitals stable at this time.

## 2024-07-05 NOTE — PROGRESS NOTES
Pharmacy Medication History Review    Leonardo Hardy is a 28 y.o. female admitted for No Principal Problem: There is no principal problem currently on the Problem List. Please update the Problem List and refresh.. Pharmacy reviewed the patient's [id number] medications and allergies for accuracy.    The list below reflectives the updated PTA list. Please review each medication in order reconciliation for additional clarification and justification.  Prior to Admission medications    Medication Sig Start Date End Date Taking? Authorizing Provider   amoxicillin-pot clavulanate (Augmentin) 875-125 mg tablet Take 1 tablet (875 mg) by mouth 2 times a day. 7/3/24  Yes Historical Provider, MD   benzoyl peroxide 5 % external wash Apply 1 Application topically every other day. 6/28/24  Yes Historical Provider, MD   cetirizine (ZyrTEC) 10 mg tablet Take 1 tablet (10 mg) by mouth once daily. As directed 12/21/17  Yes Historical Provider, MD   clindamycin (Cleocin T) 1 % lotion Apply 1 Application topically once daily.   Yes Historical Provider, MD   fluticasone (Flonase) 50 mcg/actuation nasal spray Administer 2 sprays into each nostril once daily. 6/20/24  Yes EDMOND Lynch   Hibiclens 4 % external liquid Apply 1 Application topically 3 times a week.   Yes Historical Provider, MD   spironolactone (Aldactone) 50 mg tablet Take 1 tablet (50 mg) by mouth twice a day. 6/27/24  Yes Historical Provider, MD   valACYclovir (Valtrex) 1 gram tablet Take 1 tablet (1,000 mg) by mouth once daily. 6/20/24 12/17/24 Yes EDMOND Lynch   albuterol 90 mcg/actuation inhaler Inhale 2 puffs every 4 hours if needed for wheezing or shortness of breath. 11/25/17   Historical Provider, MD        The list below reflectives the updated allergy list. Please review each documented allergy for additional clarification and justification.  Allergies  Reviewed by Ran Weeks on 7/5/2024        Severity Reactions Comments     Azithromycin High Anaphylaxis     Erythromycin High Hives, Rash, Shortness of breath ? rash as infant Throat swells    Fish Containing Products High Anaphylaxis, Hives, Shortness of breath     Sulfa (sulfonamide Antibiotics) High Anaphylaxis, Hives, Rash, Shortness of breath Throat swells    Grass Pollen-bermuda, Standard Medium Hives     Bee Pollen Not Specified Hives             Below are additional concerns with the patient's PTA list.  None    Source: Patient     Ran Weeks

## 2024-07-05 NOTE — ED PROVIDER NOTES
HPI   Chief Complaint   Patient presents with    Abscess    skin infection       28-year-old female with past medical history of hidradenitis presents the ED show the chief concern of left axillary pain.  Patient states symptoms started 3 days ago.  She states that she went to the [facility] 2 days ago and had it drained.  She says that they gave her antibiotics that she never picked them up.  She states that they took a culture of the area.  She states that her symptoms seem to get worse so she came to the ED again.  She denies any fever/chills, nausea/vomiting.  States that she has left axillary pain worse when she moves her shoulder.  She has been taking ibuprofen for her symptoms as needed.  She denies any chance of pregnancy.  History of tubal ligation.  She has no other symptoms or concerns at this time.      History provided by:  Patient   used: No                        No data recorded                   Patient History   Past Medical History:   Diagnosis Date    [facility]     Colon cancer (Multi)     Hidradenitis suppurativa     Recurrent genital herpes      Past Surgical History:   Procedure Laterality Date    CT ANGIO NECK  12/26/2020    CT ANGIO NECK 12/26/2020     Family History   Problem Relation Name Age of Onset    Diabetes Maternal Grandmother      Cancer Paternal Grandfather       Social History     Tobacco Use    Smoking status: Never    Smokeless tobacco: Never    Tobacco comments:     Canibus occasionally   Vaping Use    Vaping status: Never Used   Substance Use Topics    Alcohol use: Yes     Alcohol/week: 1.0 standard drink of alcohol     Types: 1 Glasses of wine per week     Comment: occassional    Drug use: Yes     Frequency: 1.0 times per week     Types: Marijuana     Comment: Last used 2 days ago       Physical Exam   ED Triage Vitals   Temperature Heart Rate Respirations BP   07/05/24 0800 07/05/24 0800 07/05/24 0800 07/05/24 0800   36.1 °C (97 °F) 88 16  140/85      Pulse Ox Temp src Heart Rate Source Patient Position   07/05/24 0800 -- 07/05/24 0915 --   99 %  Monitor       BP Location FiO2 (%)     -- --             Physical Exam  Constitutional: Vital signs per nursing notes.  Well developed, well nourished.  No acute distress.    Eyes: PERRL; conjunctivae and lids normal  ENT: Ears normal externally; face symmetric. voice normal  Neck: neck supple, no meningismus; trachea midline without deviation  Respiratory: normal respiratory effort and excursion; no rales, rhonchi, or wheezes; equal air entry  Cardiovascular: RRR, 2+ pulses extremities   Neurological: normal speech; CN II-XII grossly intact, normal motor and sensory function  GI: no distention, soft, nontender  : Deferred  Musculoskeletal: normal gait and station; normal digits and nails; full range of motion of left shoulder no focal bony tenderness to palpation.  Normal to palpation; normal strength/tone; neurovascular status intact.  Skin: Open area on the left axilla from previous drainage.  Culture was taken.  There is a small amount of purulent discharge coming from the area.  No obvious signs of fluctuance.  No surrounding erythema or streaking.    ED Course & MDM   Diagnoses as of 07/05/24 1030   History of hidradenitis suppurativa   Pain in left axilla   Elevated blood pressure reading       Medical Decision Making  28-year-old history of hidradenitis presents the ED today with a chief concern of left axillary pain.  Vital signs reassuring.  Patient overall appears well and is nontoxic-appearing.  Was given Toradol, Tylenol, and Augmentin in the ED. patient has full range of motion of the neck without any meningismus.  Satting 1 room air.  Not hypoxic.  Not tachycardic.  Afebrile.  Her examination reveals no evidence of abscess.  There is nothing to drain at this time.  No signs of any septic arthritis.  No signs of deep space infection.  No systemic signs or symptoms.  Not concern for sepsis.   She has not even started taking the Augmentin outpatient.  Will treat outpatient with Augmentin and tramadol.  Discussed impression and findings with patient she feels comfortable returning home.  We discussed very strict return precautions include returning for any new or worsening signs or symptoms.  Patient is in agreement with this plan.  She will follow-up with her PCP within 3 days.  Again discussed strict return precautions.  OARRS was reviewed was negative for any concerning findings.    Differential diagnosis: Abscess, deep space infection, septic joint, sepsis    Disposition/treatment  1.  Pain in left axilla  2.  History of hidradenitis suppurativa  3.  Elevated blood pressure reading    Shared decision-making was used patient feels comfortable returning home     Patient was advised to follow up with recommended provider in 1 day1 for another evaluation and exam. I advised patient/guardian to return or go to closest emergency room immediately if symptoms change, get worse, new symptoms develop prior to follow up. If there is no improvement in symptoms in the next 24 hours they are advised to return for further evaluation and exam. I also explained the plan and treatment course. Patient/guardian is in agreement with plan, treatment course, and follow up and states verbally that they will comply.    Homegoing. I discussed the differential; results and discharge plan with the patient and/or family/friend/caregiver if present.  I emphasized the importance of follow-up with the physician I referred them to in the timeframe recommended.  I explained reasons for the patient to return to the Emergency Department. They agreed that if they feel their condition is worsening or if they have any other concern they should call 911 immediately for further assistance. I gave the patient an opportunity to ask all questions they had and answered all of them accordingly. They understand return precautions and discharge  instructions. The patient and/or family/friend/caregiver expressed understanding verbally and that they would comply.        This note has been transcribed using voice recognition and may contain grammatical errors, misplaced words, incorrect words, incorrect phrases or other errors.        Procedure  Procedures     Reji Meyer PA-C  07/05/24 1039

## 2024-07-07 LAB
BACTERIA SPEC CULT: ABNORMAL
GRAM STN SPEC: ABNORMAL
GRAM STN SPEC: ABNORMAL

## 2024-07-08 ENCOUNTER — TELEPHONE (OUTPATIENT)
Dept: PHARMACY | Facility: HOSPITAL | Age: 29
End: 2024-07-08
Payer: MEDICAID

## 2024-07-08 NOTE — PROGRESS NOTES
EDPD Note: Antibiotics Reviewed and Warranted    Contacted Ms. Leonardo Hardy regarding a positive wound culture/result that was taken during their recent emergency room visit. I completed education with patient . The patient is being treated appropriately with Augmentin 875-125 mg BID x 7 days from [facility].     Patient with PMH of hidradenitis presented to the ED for left axillary pain which was drained at [facility] 2 days prior. Per ED note, she never picked up her antibiotics, so she was instructed to start them at discharge. Patient currently reports some improvement in her wound after 2.5 days of antibiotics but still has some symptoms and pus.     Counseled the patient to complete her treatment and follow-up with her provider if her symptoms persist/worsen/return.     Susceptibility data from last 90 days.  Collected Specimen Info Organism Amoxicillin/Clavulanate Ampicillin Ampicillin/Sulbactam Cefazolin Ciprofloxacin Gentamicin Levofloxacin Piperacillin/Tazobactam Trimethoprim/Sulfamethoxazole   07/05/24 Tissue/Biopsy from Skin/Superficial Abscess Klebsiella pneumoniae/variicola  S  R  S  S  S  S  S  S  S   04/23/24 Tissue/Biopsy from Skin/Superficial Abscess Mixed Anaerobic Bacteria            04/13/24 Tissue/Biopsy from Skin Lesion Mixed Anaerobic Bacteria                 No further follow up needed from EDPD Team.     Terrie Narvaez, PharmD

## 2024-07-11 ENCOUNTER — HOSPITAL ENCOUNTER (OUTPATIENT)
Dept: RADIOLOGY | Facility: CLINIC | Age: 29
Discharge: HOME | End: 2024-07-11
Payer: MEDICAID

## 2024-07-11 DIAGNOSIS — S22.080A COMPRESSION FRACTURE OF T12 VERTEBRA, INITIAL ENCOUNTER (MULTI): ICD-10-CM

## 2024-07-11 PROCEDURE — 72072 X-RAY EXAM THORAC SPINE 3VWS: CPT

## 2024-07-11 PROCEDURE — 72072 X-RAY EXAM THORAC SPINE 3VWS: CPT | Performed by: RADIOLOGY

## 2024-07-16 ENCOUNTER — APPOINTMENT (OUTPATIENT)
Dept: NEUROSURGERY | Facility: CLINIC | Age: 29
End: 2024-07-16
Payer: MEDICAID

## 2024-12-19 ENCOUNTER — HOSPITAL ENCOUNTER (EMERGENCY)
Facility: HOSPITAL | Age: 29
Discharge: HOME | End: 2024-12-19
Payer: MEDICAID

## 2024-12-19 VITALS
TEMPERATURE: 97.8 F | SYSTOLIC BLOOD PRESSURE: 124 MMHG | WEIGHT: 210 LBS | BODY MASS INDEX: 34.99 KG/M2 | DIASTOLIC BLOOD PRESSURE: 77 MMHG | HEIGHT: 65 IN | OXYGEN SATURATION: 99 % | RESPIRATION RATE: 16 BRPM | HEART RATE: 76 BPM

## 2024-12-19 DIAGNOSIS — L73.2 HYDRADENITIS: Primary | ICD-10-CM

## 2024-12-19 PROCEDURE — 99283 EMERGENCY DEPT VISIT LOW MDM: CPT

## 2024-12-19 PROCEDURE — 99281 EMR DPT VST MAYX REQ PHY/QHP: CPT

## 2024-12-19 RX ORDER — AMOXICILLIN AND CLAVULANATE POTASSIUM 875; 125 MG/1; MG/1
1 TABLET, FILM COATED ORAL EVERY 12 HOURS
Qty: 14 TABLET | Refills: 0 | Status: SHIPPED | OUTPATIENT
Start: 2024-12-19 | End: 2024-12-26

## 2024-12-19 RX ORDER — FLUCONAZOLE 150 MG/1
150 TABLET ORAL ONCE
Qty: 1 TABLET | Refills: 0 | Status: SHIPPED | OUTPATIENT
Start: 2024-12-19 | End: 2024-12-19

## 2024-12-19 ASSESSMENT — PAIN DESCRIPTION - PAIN TYPE: TYPE: ACUTE PAIN

## 2024-12-19 ASSESSMENT — PAIN - FUNCTIONAL ASSESSMENT: PAIN_FUNCTIONAL_ASSESSMENT: 0-10

## 2024-12-19 ASSESSMENT — PAIN DESCRIPTION - FREQUENCY: FREQUENCY: CONSTANT/CONTINUOUS

## 2024-12-19 ASSESSMENT — PAIN DESCRIPTION - LOCATION: LOCATION: OTHER (COMMENT)

## 2024-12-19 ASSESSMENT — PAIN DESCRIPTION - DESCRIPTORS: DESCRIPTORS: SHOOTING

## 2024-12-19 ASSESSMENT — PAIN DESCRIPTION - ORIENTATION: ORIENTATION: RIGHT

## 2024-12-19 ASSESSMENT — PAIN SCALES - GENERAL: PAINLEVEL_OUTOF10: 10 - WORST POSSIBLE PAIN

## 2024-12-19 NOTE — ED PROVIDER NOTES
HPI   Chief Complaint   Patient presents with    Abscess       Well-appearing 29-year-old female here today for possible abscess to right axillary region.  Patient states she has had this in the past.  She does a history of hidradenitis suppurativa.  She has had no surgical intervention to date.  She denies any fever or chills.  She said they are not quite abscesses but seems like they could be the start of them and he wants to go on antibiotic before the get worse.    Family history: Reviewed and not pertinent to presenting complaint.  Social history: Non-smoker, nondrinker.    Gen.: No weight loss, fatigue, anorexia, insomnia, fever.  Eyes: No vision loss, double vision, drainage, eye pain.  ENT: No pharyngitis, dry mouth.  Cardiac: No chest pain, palpitations, syncope, near syncope.  Pulmonary: No shortness of breath, cough, hemoptysis.  Heme/lymph: No swollen glands, fever, bleeding.  GI: No abdominal pain, change in bowel habits, melena, hematemesis, hematochezia, nausea, vomiting, diarrhea.  : No discharge, dysuria, frequency, urgency, hematuria.  Musculoskeletal: Bilateral axillary abscesses   skin: No rashes.  Psych: No depression, anxiety, suicidality, homicidality.  Review of systems is otherwise negative unless stated above or in history of present illness.    General: Vitals noted, no distress. Afebrile.  HEENT: Normocephalic atraumatic, PERRLA. No nystagmus, no diplopia, No trismus. TMs clear. Posterior oropharynx unremarkable. No meningismus.  Cardiac: Regular, rate, rhythm, no murmur.  Pulmonary: Lungs clear bilaterally with good aeration. No adventitious breath sounds.  Abdomen: Soft, nonsurgical. Nontender. No peritoneal signs. Normoactive bowel sounds.  Extremities: Bilateral axillary cysts/abscesses right and left side.  Not fluctuant.  Mildly indurated.  No peripheral edema. Negative Homans bilaterally, no cords.  Skin: No rash.  Neuro: No focal neurologic deficits, NIH score of 0.    ED course  and plan MDM  Well-appearing 29-year-old female no current medical problems hidradenitis chief complaint today of possible abscess right axillary left axillary region.  On exam patient has 2 nodular appearing skin lesions to the right axillary region.  They are not abscesses but they are hardened.  Left axillary region similar.  They are very small.  Not ready for I&D.  Placed on Augmentin which she states she has had in the past because a culture has been performed.  She said this is effective for her.  Placed on 7 days of Augmentin and advised to follow-up with general surgery.  I did place a referral in the system for her for a general surgeon.  She understands she will need to come back to the emergency room for incision and drainage of these do progressively worse.  Advised what to look for              Patient History   Past Medical History:   Diagnosis Date    Asthma ([facility])     Colon cancer (Multi)     Hidradenitis suppurativa     Recurrent genital herpes      Past Surgical History:   Procedure Laterality Date    CT ANGIO NECK  12/26/2020    CT ANGIO NECK 12/26/2020     Family History   Problem Relation Name Age of Onset    Diabetes Maternal Grandmother      Cancer Paternal Grandfather       Social History     Tobacco Use    Smoking status: Never    Smokeless tobacco: Never    Tobacco comments:     Canibus occasionally   Vaping Use    Vaping status: Never Used   Substance Use Topics    Alcohol use: Yes     Alcohol/week: 1.0 standard drink of alcohol     Types: 1 Glasses of wine per week     Comment: occassional    Drug use: Yes     Frequency: 1.0 times per week     Types: Marijuana     Comment: Last used 2 days ago       Physical Exam   ED Triage Vitals [12/19/24 0859]   Temperature Heart Rate Respirations BP   36.6 °C (97.8 °F) 76 16 124/77      Pulse Ox Temp Source Heart Rate Source Patient Position   99 % Temporal -- --      BP Location FiO2 (%)     -- --       Physical Exam      ED Course & MDM                   No data recorded     Sasha Coma Scale Score: 15 (12/19/24 0858 : Joey Pride RN)                           Medical Decision Making      Procedure  Procedures     Siva Tucker PA-C  12/19/24 8790

## 2024-12-19 NOTE — ED TRIAGE NOTES
Pt c/o possible abscesses under the right axillary region that started a few days ago. Pt denies any drainage at this time.

## 2024-12-22 ENCOUNTER — HOSPITAL ENCOUNTER (EMERGENCY)
Facility: HOSPITAL | Age: 29
Discharge: HOME | End: 2024-12-22
Payer: MEDICAID

## 2024-12-22 VITALS
TEMPERATURE: 98.1 F | WEIGHT: 210 LBS | HEIGHT: 65 IN | BODY MASS INDEX: 34.99 KG/M2 | SYSTOLIC BLOOD PRESSURE: 140 MMHG | RESPIRATION RATE: 18 BRPM | DIASTOLIC BLOOD PRESSURE: 83 MMHG | HEART RATE: 86 BPM | OXYGEN SATURATION: 98 %

## 2024-12-22 DIAGNOSIS — L73.2 HIDRADENITIS SUPPURATIVA OF RIGHT AXILLA: Primary | ICD-10-CM

## 2024-12-22 PROCEDURE — 2500000004 HC RX 250 GENERAL PHARMACY W/ HCPCS (ALT 636 FOR OP/ED): Performed by: PHYSICIAN ASSISTANT

## 2024-12-22 PROCEDURE — 2500000001 HC RX 250 WO HCPCS SELF ADMINISTERED DRUGS (ALT 637 FOR MEDICARE OP): Performed by: PHYSICIAN ASSISTANT

## 2024-12-22 PROCEDURE — 10061 I&D ABSCESS COMP/MULTIPLE: CPT | Performed by: PHYSICIAN ASSISTANT

## 2024-12-22 PROCEDURE — 87077 CULTURE AEROBIC IDENTIFY: CPT | Mod: AHULAB | Performed by: PHYSICIAN ASSISTANT

## 2024-12-22 PROCEDURE — 99283 EMERGENCY DEPT VISIT LOW MDM: CPT | Mod: 25

## 2024-12-22 RX ORDER — TRAMADOL HYDROCHLORIDE 50 MG/1
50 TABLET ORAL EVERY 6 HOURS PRN
Qty: 8 TABLET | Refills: 0 | Status: SHIPPED | OUTPATIENT
Start: 2024-12-22

## 2024-12-22 RX ORDER — OXYCODONE AND ACETAMINOPHEN 5; 325 MG/1; MG/1
1 TABLET ORAL ONCE
Status: COMPLETED | OUTPATIENT
Start: 2024-12-22 | End: 2024-12-22

## 2024-12-22 RX ORDER — IBUPROFEN 600 MG/1
600 TABLET ORAL ONCE
Status: COMPLETED | OUTPATIENT
Start: 2024-12-22 | End: 2024-12-22

## 2024-12-22 RX ORDER — ONDANSETRON 4 MG/1
4 TABLET, ORALLY DISINTEGRATING ORAL ONCE
Status: COMPLETED | OUTPATIENT
Start: 2024-12-22 | End: 2024-12-22

## 2024-12-22 RX ORDER — DOXYCYCLINE 100 MG/1
100 TABLET ORAL 2 TIMES DAILY
Qty: 14 TABLET | Refills: 0 | Status: SHIPPED | OUTPATIENT
Start: 2024-12-22 | End: 2024-12-27

## 2024-12-22 RX ORDER — IBUPROFEN 600 MG/1
600 TABLET ORAL EVERY 6 HOURS PRN
Qty: 20 TABLET | Refills: 0 | Status: SHIPPED | OUTPATIENT
Start: 2024-12-22

## 2024-12-22 RX ORDER — LIDOCAINE HYDROCHLORIDE AND EPINEPHRINE 10; 10 UG/ML; MG/ML
10 INJECTION, SOLUTION INFILTRATION; PERINEURAL ONCE
Status: DISCONTINUED | OUTPATIENT
Start: 2024-12-22 | End: 2024-12-22 | Stop reason: HOSPADM

## 2024-12-22 ASSESSMENT — PAIN DESCRIPTION - PAIN TYPE: TYPE: ACUTE PAIN

## 2024-12-22 ASSESSMENT — PAIN - FUNCTIONAL ASSESSMENT: PAIN_FUNCTIONAL_ASSESSMENT: 0-10

## 2024-12-22 ASSESSMENT — PAIN DESCRIPTION - FREQUENCY: FREQUENCY: CONSTANT/CONTINUOUS

## 2024-12-22 ASSESSMENT — PAIN DESCRIPTION - LOCATION: LOCATION: OTHER (COMMENT)

## 2024-12-22 ASSESSMENT — PAIN DESCRIPTION - ORIENTATION: ORIENTATION: RIGHT

## 2024-12-22 ASSESSMENT — PAIN DESCRIPTION - DESCRIPTORS: DESCRIPTORS: BURNING

## 2024-12-22 ASSESSMENT — PAIN DESCRIPTION - PROGRESSION: CLINICAL_PROGRESSION: NOT CHANGED

## 2024-12-22 ASSESSMENT — PAIN DESCRIPTION - ONSET: ONSET: PROGRESSIVE

## 2024-12-22 ASSESSMENT — PAIN SCALES - GENERAL: PAINLEVEL_OUTOF10: 10 - WORST POSSIBLE PAIN

## 2024-12-22 NOTE — ED PROVIDER NOTES
Chief Complaint   Patient presents with    Abscess     Right side armpit  x2     HPI:   Leonardo Hardy is an 29 y.o. female with history of hidradenitis suppurativa, asthma, MDD who presents to the ED for 2 abscesses in her right axilla.  She says she was seen for them on 1219 and prescribed course of Augmentin.  She has been taking it as instructed.  She has also been using warm compresses.  Now she has 2 fluctuant abscesses in the axilla that she is hoping to have drained.  She reports 10/10 pain.  Last took Tylenol and ibuprofen last night before bed.  Pain is worse with movement and touch.  No relieving factors.  She denies any numbness, tingling, extremity weakness, fever, chills, chest pain or shortness of breath.  She is right-hand dominant    Medications:  Soc HX: Marijuana, occasional EtOH  Allergies   Allergen Reactions    Azithromycin Anaphylaxis    Erythromycin Hives, Rash and Shortness of breath     ? rash as infant    Throat swells    Fish Containing Products Anaphylaxis, Hives and Shortness of breath    Sulfa (Sulfonamide Antibiotics) Anaphylaxis, Hives, Rash and Shortness of breath     Throat swells    Grass Pollen-[place], Standard Hives    Bee Pollen Hives   :  Past Medical History:   Diagnosis Date    Asthma     Colon cancer (Multi)     Hidradenitis suppurativa     Recurrent genital herpes      Past Surgical History:   Procedure Laterality Date    CT ANGIO NECK  12/26/2020    CT ANGIO NECK 12/26/2020     Family History   Problem Relation Name Age of Onset    Diabetes Maternal Grandmother      Cancer Paternal Grandfather        Physical Exam  Vitals and nursing note reviewed.   Constitutional:       General: She is not in acute distress.     Appearance: Normal appearance. She is not ill-appearing or toxic-appearing.      Comments: Elevated BMI   Eyes:      Pupils: Pupils are equal, round, and reactive to light.   Cardiovascular:      Rate and Rhythm: Normal rate and regular rhythm.      Pulses:  Normal pulses.      Heart sounds: Normal heart sounds.   Pulmonary:      Effort: Pulmonary effort is normal.      Breath sounds: Normal breath sounds.   Musculoskeletal:         General: No deformity or signs of injury. Normal range of motion.   Skin:     General: Skin is warm and dry.      Capillary Refill: Capillary refill takes less than 2 seconds.      Comments: 2 cm fluctuant abscess underside of right upper arm in right axilla.  No surrounding erythema nor induration.  3 cm fluctuant abscess on right lateral chest wall with surrounding induration and erythema.   Neurological:      Mental Status: She is alert.      Cranial Nerves: No cranial nerve deficit.     VS: As documented in the triage note and EMR flowsheet from this visit were reviewed.    External Records Reviewed: I reviewed recent and relevant outside records including: Reviewed ED provider note 12/19/2024.  Patient seen for left axillary abscess and 2 nodules right axilla.  She was placed on Augmentin twice daily x 7 days and referred to general surgery.      Medical Decision Making:   ED Course as of 12/22/24 1837   Sun Dec 22, 2024   0843 Vitals Reviewed: Afebrile. Hypertensive. Not tachycardic nor tachypneic. No hypoxia.   [KA]   0910 Patient is 29-year-old female presents to the ED for evaluation of right axillary abscess x 2.  On exam she has roughly 2 cm fluctuant abscess on the underside of the right axilla and roughly 3 cm fluctuant abscess with surrounding erythema and induration on the right lateral chest wall.  Will perform incision and drainage.  Patient to be given Zofran, Norco and ibuprofen first.  She is currently on Augmentin.  Follow-up visit with surgery scheduled for January [KA]   1836 Patient underwent incision and drainage x 2.  She tolerated well.  There was copious purulent fluid, after irrigating I obtained wound culture.  Instructed patient to continue on Augmentin.  Gave her paper prescription for doxycycline.  If culture  comes back not susceptible to Augmentin and she gets a phone call she can change to Doxy or add it as an additional antibiotic.  I reviewed OARRS with no concerning findings.  Will send her home with short course of tramadol, ibuprofen.  Recommended that she continue to keep area clean.  Keep appointment with general surgery and follow-up with PCP in 2 to 5 days.  Patient provided with work note.  Advised to return to ED for any new or worsening symptoms.  She is agreeable. [KA]      ED Course User Index  [KA] Karma Cee PA-C         Diagnoses as of 12/22/24 1837   Hidradenitis suppurativa of right axilla     Incision and Drainage    Performed by: Karma Cee PA-C  Authorized by: Karma Cee PA-C    Consent:     Consent obtained:  Verbal    Consent given by:  Patient    Risks, benefits, and alternatives were discussed: yes      Risks discussed:  Bleeding, incomplete drainage, pain and infection    Alternatives discussed:  No treatment and delayed treatment  Universal protocol:     Procedure explained and questions answered to patient or proxy's satisfaction: yes      Immediately prior to procedure, a time out was called: yes      Patient identity confirmed:  Verbally with patient  Location:     Type:  Abscess (First abscess 2 cm, second abscess 3 cm with erythema and induration)    Location: Both of them are in right axilla.  Pre-procedure details:     Skin preparation:  Povidone-iodine  Sedation:     Sedation type:  None  Anesthesia:     Anesthesia method:  Local infiltration    Local anesthetic:  Lidocaine 1% WITH epi  Procedure type:     Complexity:  Complex  Procedure details:     Ultrasound guidance: no      Incision types:  Single straight    Incision depth:  Subcutaneous    Techniques: Both abscesses were probed and deloculated, both abscesses were irrigated with sterile saline.    Drainage:  Purulent    Drainage amount:  Copious    Wound treatment:  Wound left open    Packing materials:   None  Post-procedure details:     Procedure completion:  Tolerated well, no immediate complications     Escalation of Care: Appropriate for outpatient management     Counseling: Spoke with the patient and discussed today´s findings, in addition to providing specific details for the plan of care and expected course.  Patient was given the opportunity to ask questions.    Discussed return precautions and importance of follow-up.  Advised to follow-up with general surgery and/or PCP.  Advised to return to the ED for changing or worsening symptoms, new symptoms, complaint specific precautions, and precautions listed on the discharge paperwork.  Educated on the common potential side effects of medications prescribed.    I advised the patient that the emergency evaluation and treatment provided today doesn't end their need for medical care. It is very important that they follow-up with their primary care provider or other specialist as instructed.    The plan of care was mutually agreed upon with the patient. The patient and/or family were given the opportunity to ask questions. All questions asked today in the ED were answered to the best of my ability with today's information.    I specifically advised the patient to return to the ED for changing or worsening symptoms, worrisome new symptoms, or for any complaint specific precautions listed on the discharge paperwork.    This patient was cared for in the setting of nationwide stress on resources and staffing.    This report was transcribed using voice recognition software.  Every effort was made to ensure accuracy, however, inadvertently computerized transcription errors may be present.       Karma Cee PA-C  12/22/24 3665

## 2024-12-22 NOTE — Clinical Note
Leonardo Hardy was seen and treated in our emergency department on 12/22/2024.  She may return to work on 12/24/2024.       If you have any questions or concerns, please don't hesitate to call.      Karma Cee PA-C

## 2024-12-22 NOTE — ED TRIAGE NOTES
Pt states that she came in three days ago and received antibiotics for two abscesses she has in her right armpit. Pt states that the abscess in her left armpit popped on it's own. Pt states that the two under her right armpit are causing extreme pain and are ready to be drained.

## 2024-12-22 NOTE — DISCHARGE INSTRUCTIONS
Please continue Tylenol 1 g every 4-6 hours as needed and ibuprofen 600 mg every 6-8 hours as needed.  May take tramadol as needed for severe pain.  This is narcotic.  Do not drive, drink alcohol or operate heavy machinery while taking this medication.  Do not take other sedating medications when taking narcotics.  Narcotics can cause constipation.  Drink extra water and eat extra fiber to help prevent this. May take stool softners as needed.    Continue Augmentin as previously prescribed.  Once culture comes back if Augmentin is not susceptible, can start doxycycline.  Follow-up with primary care provider within the next 2 to 5 days.  Follow-up with general surgery as scheduled.  Return to ER for any new or worsening symptoms.

## 2024-12-26 LAB
B-LACTAMASE ORGANISM ISLT: POSITIVE
BACTERIA SPEC CULT: ABNORMAL
BACTERIA SPEC CULT: ABNORMAL
GRAM STN SPEC: ABNORMAL
GRAM STN SPEC: ABNORMAL

## 2024-12-27 ENCOUNTER — TELEPHONE (OUTPATIENT)
Dept: PHARMACY | Facility: HOSPITAL | Age: 29
End: 2024-12-27
Payer: MEDICAID

## 2024-12-27 NOTE — PROGRESS NOTES
EDPD Note: Antibiotics Reviewed and Warranted    Contacted Mr./Mrs./Ms. Leonardo Hardy regarding a positive tissue culture/result that was taken during their recent emergency room visit. I completed education with patient . The patient is being treated appropriately with Augmentin 875-125mg BID x10.     Patient presented to ED on 12/22 with an abscess under the arm. She has hidradenitis suppurativa and was in the ED on 12/19 for her abscess as well. At time of call, patient reports she is healing well, with reduced pain, however there is still some sore spots. Reports still draining greenish-yellow purulent fluid with blood, and is changing bandages once daily. Patient was on day 2.5 of augmentin when she returned to the ED, and was prescribed doxycycline, however patient was waiting for results of culture prior to switching antibiotics. Counseled patient not to start doxycycline, as it is resistant. She has completed the Augmentin, and has a follow up with general surgery scheduled. Counseled patient to return for care if symptoms worsen prior to gen surgery follow up.     Susceptibility data from last 90 days.  Collected Specimen Info Organism Amoxicillin/Clavulanate Ampicillin Ampicillin/Sulbactam Cefazolin Ciprofloxacin Gentamicin Levofloxacin Piperacillin/Tazobactam Trimethoprim/Sulfamethoxazole   12/22/24 Tissue/Biopsy from Skin/Superficial Abscess Klebsiella pneumoniae/variicola  S  R  S  S  S  S  S  S  S     Mixed Anaerobic Bacteria                 No further follow up needed from EDPD Team.     Liliana Austin, PharmD

## 2025-01-13 ENCOUNTER — TELEPHONE (OUTPATIENT)
Dept: SURGERY | Facility: CLINIC | Age: 30
End: 2025-01-13
Payer: MEDICAID

## 2025-01-17 NOTE — PROGRESS NOTES
"History Of Present Illness :  Leonardo Hardy is a 29 y.o. female who presents on referral from the emergency department for further evaluation and treatment of right axillary abscesses x 2 secondary to hidradenitis suppurativa.    The patient has a history of hidradenitis suppurativa.  She has never sought surgical attention in the past.  She was recently seen in emergency department on 12/19/2024 and that note was reviewed.  At that time she is found to have infected hidradenitis suppurativa of the right axilla, was discharged to home on Augmentin.  She returned 3 days later on 12/20/2024 with worsening symptoms.  That note was reviewed as well.  On examination at that time, the following was noted: \"2 cm fluctuant abscess underside of right upper arm in right axilla. No surrounding erythema nor induration. 3 cm fluctuant abscess on right lateral chest wall with surrounding induration and erythema.\"  She underwent incision and drainage x 2.    Culture revealed:  Tissue/Wound Culture/Smear (2+) Few Klebsiella pneumoniae/variicola Abnormal    (2+) Few Mixed Anaerobic Bacteria   Beta Lactamase (Cefinase) Positive            Gram Stain  Abnormal   (3+) Moderate Polymorphonuclear leukocytes   (3+) Moderate Gram positive cocci           Resulting Agency: [facility]     Susceptibility     Klebsiella pneumoniae/variicola     MICROSCAN    Amoxicillin/Clavulanate Susceptible    Ampicillin Resistant    Ampicillin/Sulbactam Susceptible    Cefazolin Susceptible    Ciprofloxacin Susceptible    Gentamicin Susceptible    Levofloxacin Susceptible    Piperacillin/Tazobactam Susceptible    Trimethoprim/Sulfamethoxazole Susceptible         Pharm.D. note from 12/27/2024 reviewed.        Past Medical History   Past Medical History:   Diagnosis Date    Asthma     Colon cancer (Multi)     Hidradenitis suppurativa     Recurrent genital herpes         Surgical History    Past Surgical History:   Procedure Laterality Date    CT ANGIO NECK  " 12/26/2020    CT ANGIO NECK 12/26/2020        Allergies   Allergies   Allergen Reactions    Azithromycin Anaphylaxis    Erythromycin Hives, Rash and Shortness of breath     ? rash as infant    Throat swells    Fish Containing Products Anaphylaxis, Hives and Shortness of breath    Sulfa (Sulfonamide Antibiotics) Anaphylaxis, Hives, Rash and Shortness of breath     Throat swells    Grass Pollen-[place], Standard Hives    Bee Pollen Hives        Home Meds  Current Outpatient Medications   Medication Instructions    albuterol 90 mcg/actuation inhaler 2 puffs, inhalation, Every 4 hours PRN    benzoyl peroxide 5 % external wash 1 Application, Topical, Every other day    cetirizine (ZyrTEC) 10 mg tablet 1 tablet, oral, Daily, As directed    clindamycin (Cleocin T) 1 % lotion 1 Application, Topical, Daily    fluticasone (Flonase) 50 mcg/actuation nasal spray 2 sprays, Each Nostril, Daily    Hibiclens 4 % external liquid 1 Application, Topical, 3 times weekly    ibuprofen 600 mg, oral, Every 6 hours PRN    spironolactone (ALDACTONE) 50 mg, oral, 2 times daily    traMADol (ULTRAM) 50 mg, oral, Every 6 hours PRN        Family History    Family History   Problem Relation Name Age of Onset    Diabetes Maternal Grandmother      Cancer Paternal Grandfather          Social History  Social History     Tobacco Use    Smoking status: Never    Smokeless tobacco: Never    Tobacco comments:     Canibus occasionally   Vaping Use    Vaping status: Never Used   Substance Use Topics    Alcohol use: Yes     Alcohol/week: 1.0 standard drink of alcohol     Types: 1 Glasses of wine per week     Comment: occassional    Drug use: Yes     Frequency: 1.0 times per week     Types: Marijuana     Comment: Last used 2 days ago        Review Of Systems    Review of Systems    General: Not Present- Obesity, Cancer, HIV, MRSA, Recent Cold/Flu, Tired During the Day and VRE.  HEENT: Not Present- Migraine, Cataracts, Glaucoma, Macular Degeneration and Retinal  Detachment.  Respiratory: Not Present- Chronic Cough, Difficulty Breathing on Exertion, Difficulty Breathing at Rest, Emphysema, Frequent Bronchitis, Home CPAP/BiPAP, Home Oxygen, Pulmonary Embolus, Pneumonia/TB, Sleep Apnea and Snoring.  Cardiovascular: Not Present- Chest Pain, Congestive Heart Failure, Heart Attack, Coronary Artery Disease, Heart Stent, High Cholesterol/Lipids, Hypertension, Internal Defibrillator, Irregular Heart Beat, Mitral Valve Prolapse, Murmur, Pacemaker and Peripheral Vascular Disease.  Gastrointestinal: Not Present- Heartburn, Hepatitis, Hiatal Hernia, Jaundice, Reflux, Stomach Ulcer and IBS.  Female Genitourinary: Not Present- Kidney Failure, Kidney Stones, Dialysis and Urinary Tract Infection.  Musculoskeletal: Not Present- Arthritis, Back Pain and Fibromyalgia.  Neurological: Not Present- Headaches, Numbness, Tingling, Seizures, Stroke,  Shunt and Weakness.  Psychiatric: Not Present- Anxiety, Bipolar, Depression and Panic Attacks.  Endocrine: Not Present- Diabetes, Hyperthyroidism, Hypothyroidism and Low Blood Sugar.  Hematology: Not Present- Abnormal Bleeding, Anemia and Blood Clots.    Vitals  There were no vitals taken for this visit.     Physical Exam   Skin & Soft Tissue Exam    Integumentary  Global Assessment: Examination of related systems reveals - Well-developed, well-nourished and in no acute distress; alert and oriented x 3,  Neck supple, with no palpable masses, no thyromegaly, No lymphadenopathy and Apocrine and  eccrine glands are normal with no hyperhidrosis.   Upon inspection and palpation of skin surfaces of the - Head/Face: no rashes, ulcers, lesions or evidence of photo damage. No palpable nodules or masses, Neck: no visible lesions or palpable masses, Chest: no swelling,scarring or lesions. No prominent arteries or veins observed, Axillae: non-tender, no inflammation or ulceration, no drainage., Abdomen: no scars, rashes or lesions, Back: normal skin surface, no  evidence of  photo damage, no suspicious lesions, Right upper extremity: no lesions or rashes. No evidence of photo damage, Left upper extremity: no lesions or rashes. No evidence of photo damage, Right lower extremity: no stasis ulcers, rashes or suspicious lesions. No evidence of photo damage, Left lower extremity: no stasis ulcers, rashes or suspicious lesions. No evidence of  photo damage, Distribution of scalp and body hair is normal and No dandruff or other scalp lesions noted.    Chest and Lung Exam  Chest and lung exam reveals - normal excursion with symmetric chest walls and on auscultation, normal breath sounds, no  adventitious sounds and normal vocal resonance.    Cardiovascular  Cardiovascular examination reveals - normal heart sounds, regular rate and rhythm with no murmurs.    Assessment/Plan   ***   sagittal area of diffuse scarring, and induration.  No tenderness or fluctuance.  Very minimal erythema medially.  Left axilla: There is a 12 cm transverse by 6 cm sagittal area of less diffuse scarring and induration.  No fluctuance or tenderness.  No erythema.    Bilateral inguinal crease: Very minimal scattered areas of scarring    Sacrococcygeal: Just to the left of the buck cleft, there is a 3 x 3 cm area of induration with no fluctuance erythema or tenderness in the very medial left buttock.    Assessment/Plan   Ms. Hardy has evidence of hidradenitis suppurativa particularly of the bilateral axillae, and also the bilateral inguinal regions.  Examination today revealed that the disease is relatively quiescent with no active infection or abscess.    The patient has an area of induration of the medial left buttock with no tenderness erythema or fluctuance, of uncertain etiology.    Recommendations:    As there is no evidence of abscess, incision and drainage is not indicated.     Similarly, I do not think there is any evidence of active infection, therefore oral antibiotic therapy is not necessary at this point either.    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  I recommend referral to a dermatologist who has an expertise in hidradenitis suppurativa or perhaps immuno therapy.  Dr. Jesús Craft and colleagues at [facility] in Dermatology in [place] ([phone]) or [place] ([phone]) is oneresource, or the patient can research a dermatologist in the  system who has a special interest in treatment of hidradenitis.      I will be happy to see the patient back as necessary for treatment of an associated abscess or incisional drainage if necessary.    Otherwise, the patient will follow with me as needed.

## 2025-01-20 ENCOUNTER — APPOINTMENT (OUTPATIENT)
Dept: SURGERY | Facility: CLINIC | Age: 30
End: 2025-01-20
Payer: MEDICAID

## 2025-01-21 ENCOUNTER — APPOINTMENT (OUTPATIENT)
Dept: SURGERY | Facility: CLINIC | Age: 30
End: 2025-01-21
Payer: MEDICAID

## 2025-01-21 VITALS
TEMPERATURE: 97.8 F | HEART RATE: 72 BPM | OXYGEN SATURATION: 97 % | HEIGHT: 65 IN | BODY MASS INDEX: 35.16 KG/M2 | WEIGHT: 211 LBS | SYSTOLIC BLOOD PRESSURE: 140 MMHG | RESPIRATION RATE: 17 BRPM | DIASTOLIC BLOOD PRESSURE: 81 MMHG

## 2025-01-21 DIAGNOSIS — L73.2 HYDRADENITIS: ICD-10-CM

## 2025-01-21 PROCEDURE — 3008F BODY MASS INDEX DOCD: CPT | Performed by: SURGERY

## 2025-01-21 PROCEDURE — 99214 OFFICE O/P EST MOD 30 MIN: CPT | Performed by: SURGERY

## 2025-01-21 RX ORDER — MECLIZINE HCL 25MG 25 MG/1
25 TABLET, CHEWABLE ORAL 3 TIMES DAILY
COMMUNITY
Start: 2024-07-09

## 2025-01-21 RX ORDER — FLUCONAZOLE 150 MG/1
TABLET ORAL
COMMUNITY
Start: 2024-12-19

## 2025-01-21 RX ORDER — DOCUSATE SODIUM 100 MG/1
100 CAPSULE, LIQUID FILLED ORAL
COMMUNITY
Start: 2024-07-13

## 2025-01-22 ENCOUNTER — HOSPITAL ENCOUNTER (EMERGENCY)
Facility: HOSPITAL | Age: 30
Discharge: HOME | End: 2025-01-22
Payer: MEDICAID

## 2025-01-22 VITALS
OXYGEN SATURATION: 96 % | DIASTOLIC BLOOD PRESSURE: 80 MMHG | WEIGHT: 211 LBS | RESPIRATION RATE: 18 BRPM | BODY MASS INDEX: 35.16 KG/M2 | SYSTOLIC BLOOD PRESSURE: 133 MMHG | HEIGHT: 65 IN | TEMPERATURE: 98.3 F | HEART RATE: 77 BPM

## 2025-01-22 DIAGNOSIS — L02.91 ABSCESS: Primary | ICD-10-CM

## 2025-01-22 PROCEDURE — 2500000001 HC RX 250 WO HCPCS SELF ADMINISTERED DRUGS (ALT 637 FOR MEDICARE OP)

## 2025-01-22 PROCEDURE — 87205 SMEAR GRAM STAIN: CPT | Mod: AHULAB

## 2025-01-22 PROCEDURE — 2500000004 HC RX 250 GENERAL PHARMACY W/ HCPCS (ALT 636 FOR OP/ED)

## 2025-01-22 PROCEDURE — 10061 I&D ABSCESS COMP/MULTIPLE: CPT

## 2025-01-22 PROCEDURE — 99283 EMERGENCY DEPT VISIT LOW MDM: CPT | Mod: 25

## 2025-01-22 RX ORDER — TRAMADOL HYDROCHLORIDE 50 MG/1
50 TABLET ORAL EVERY 6 HOURS PRN
Qty: 12 TABLET | Refills: 0 | Status: SHIPPED | OUTPATIENT
Start: 2025-01-22 | End: 2025-01-25

## 2025-01-22 RX ORDER — IBUPROFEN 400 MG/1
800 TABLET ORAL ONCE
Status: COMPLETED | OUTPATIENT
Start: 2025-01-22 | End: 2025-01-22

## 2025-01-22 RX ORDER — AMOXICILLIN AND CLAVULANATE POTASSIUM 875; 125 MG/1; MG/1
1 TABLET, FILM COATED ORAL ONCE
Status: COMPLETED | OUTPATIENT
Start: 2025-01-22 | End: 2025-01-22

## 2025-01-22 RX ORDER — ONDANSETRON 4 MG/1
4 TABLET, ORALLY DISINTEGRATING ORAL ONCE
Status: COMPLETED | OUTPATIENT
Start: 2025-01-22 | End: 2025-01-22

## 2025-01-22 RX ORDER — HYDROCODONE BITARTRATE AND ACETAMINOPHEN 5; 325 MG/1; MG/1
1 TABLET ORAL ONCE
Status: COMPLETED | OUTPATIENT
Start: 2025-01-22 | End: 2025-01-22

## 2025-01-22 RX ORDER — AMOXICILLIN AND CLAVULANATE POTASSIUM 875; 125 MG/1; MG/1
1 TABLET, FILM COATED ORAL EVERY 12 HOURS
Qty: 14 TABLET | Refills: 0 | Status: SHIPPED | OUTPATIENT
Start: 2025-01-22 | End: 2025-01-29

## 2025-01-22 RX ORDER — LIDOCAINE HYDROCHLORIDE AND EPINEPHRINE 10; 10 UG/ML; MG/ML
20 INJECTION, SOLUTION INFILTRATION; PERINEURAL ONCE
Status: COMPLETED | OUTPATIENT
Start: 2025-01-22 | End: 2025-01-22

## 2025-01-22 RX ADMIN — HYDROCODONE BITARTRATE AND ACETAMINOPHEN 1 TABLET: 5; 325 TABLET ORAL at 07:44

## 2025-01-22 RX ADMIN — ONDANSETRON 4 MG: 4 TABLET, ORALLY DISINTEGRATING ORAL at 07:44

## 2025-01-22 RX ADMIN — LIDOCAINE HYDROCHLORIDE,EPINEPHRINE BITARTRATE 20 ML: 10; .01 INJECTION, SOLUTION INFILTRATION; PERINEURAL at 08:38

## 2025-01-22 RX ADMIN — AMOXICILLIN AND CLAVULANATE POTASSIUM 1 TABLET: 875; 125 TABLET, COATED ORAL at 08:36

## 2025-01-22 RX ADMIN — IBUPROFEN 800 MG: 400 TABLET, FILM COATED ORAL at 07:44

## 2025-01-22 ASSESSMENT — COLUMBIA-SUICIDE SEVERITY RATING SCALE - C-SSRS
2. HAVE YOU ACTUALLY HAD ANY THOUGHTS OF KILLING YOURSELF?: NO
6. HAVE YOU EVER DONE ANYTHING, STARTED TO DO ANYTHING, OR PREPARED TO DO ANYTHING TO END YOUR LIFE?: NO
1. IN THE PAST MONTH, HAVE YOU WISHED YOU WERE DEAD OR WISHED YOU COULD GO TO SLEEP AND NOT WAKE UP?: NO

## 2025-01-22 ASSESSMENT — PAIN DESCRIPTION - PROGRESSION: CLINICAL_PROGRESSION: NOT CHANGED

## 2025-01-22 ASSESSMENT — PAIN SCALES - GENERAL
PAINLEVEL_OUTOF10: 0 - NO PAIN
PAINLEVEL_OUTOF10: 10 - WORST POSSIBLE PAIN

## 2025-01-22 ASSESSMENT — PAIN DESCRIPTION - PAIN TYPE: TYPE: ACUTE PAIN

## 2025-01-22 ASSESSMENT — PAIN DESCRIPTION - ONSET: ONSET: GRADUAL

## 2025-01-22 ASSESSMENT — PAIN DESCRIPTION - LOCATION: LOCATION: BUTTOCKS

## 2025-01-22 ASSESSMENT — PAIN - FUNCTIONAL ASSESSMENT: PAIN_FUNCTIONAL_ASSESSMENT: 0-10

## 2025-01-22 ASSESSMENT — PAIN DESCRIPTION - ORIENTATION: ORIENTATION: LEFT

## 2025-01-22 ASSESSMENT — PAIN DESCRIPTION - FREQUENCY: FREQUENCY: CONSTANT/CONTINUOUS

## 2025-01-22 ASSESSMENT — PAIN DESCRIPTION - DESCRIPTORS: DESCRIPTORS: ACHING

## 2025-01-22 NOTE — DISCHARGE INSTRUCTIONS
Please take Augmentin every 12 hours for 7 days  Can take tramadol 50 mg every 6 hours as needed for severe pain.  This is narcotic.  Do not drive, drink alcohol or operate heavy machinery while taking this medication.  Do not take other sedating medications when taking narcotics.  Narcotics can cause constipation.  Drink extra water and eat extra fiber to help prevent this. May take stool softners as needed.  Continue warm compresses and sitz baths at home  Follow-up with your general surgeon within the next couple of days  Please return to ED for any new or worsening symptoms

## 2025-01-22 NOTE — ED TRIAGE NOTES
Pt c/o of abscess on left buttock for five days. PT states having pain and swelling. Pain increases when walking or sitting.

## 2025-01-22 NOTE — Clinical Note
Leonardo Hardy was seen and treated in our emergency department on 1/22/2025.  She may return to work on 01/23/2025.       If you have any questions or concerns, please don't hesitate to call.      Rich Prasad PA-C

## 2025-01-22 NOTE — ED PROVIDER NOTES
HPI   Chief Complaint   Patient presents with    Abscess       Patient is a 29-year-old female presenting to the ED with concern for an abscess on the left side of her gluteal cleft.  Patient states that has been there for 5 days.  She has noted a hard, painful area that makes it hard to walk and sit.  Patient states that the pain feels pulsating and shooting.  The area is swollen and tender to the touch.  Denies any redness warmth or discharge from the area.  She tried taking ibuprofen at home with no relief.  She has been using sit baths and heating pads on patient states it appears this brought the area to a carrillo.  Denies any fever, chills, weakness, malaise, myalgias, sweats suggestive of systemic infection.  Denies diabetes, cancer, chemotherapy, steroid use, or HIV.  Patient has a past medical history of hidradenitis suppurativa and has seen general surgery. She had two abscess in her axillae drained about a month ago. She does not have any problems with those abscesses and notes they have resolved completely. She was referred to an HS specialist for potential immunotherapy.           Patient History   Past Medical History:   Diagnosis Date    Asthma     Colon cancer (Multi)     Hidradenitis suppurativa     Recurrent genital herpes      Past Surgical History:   Procedure Laterality Date    CT ANGIO NECK  12/26/2020    CT ANGIO NECK 12/26/2020     Family History   Problem Relation Name Age of Onset    Diabetes Maternal Grandmother      Cancer Paternal Grandfather       Social History     Tobacco Use    Smoking status: Never    Smokeless tobacco: Never    Tobacco comments:     Canibus occasionally   Vaping Use    Vaping status: Never Used   Substance Use Topics    Alcohol use: Yes     Alcohol/week: 1.0 standard drink of alcohol     Types: 1 Glasses of wine per week     Comment: occassional    Drug use: Yes     Frequency: 1.0 times per week     Types: Marijuana     Comment: Last used 2 days ago        Physical Exam   ED Triage Vitals [01/22/25 0714]   Temperature Heart Rate Respirations BP   36.8 °C (98.3 °F) 77 18 133/80      Pulse Ox Temp Source Heart Rate Source Patient Position   96 % Tympanic -- Sitting      BP Location FiO2 (%)     Right arm --       Physical Exam  Constitutional:       General: She is not in acute distress.     Appearance: Normal appearance. She is not ill-appearing, toxic-appearing or diaphoretic.   HENT:      Head: Normocephalic and atraumatic.   Cardiovascular:      Rate and Rhythm: Normal rate and regular rhythm.      Pulses: Normal pulses.      Heart sounds: Normal heart sounds. No murmur heard.     No friction rub. No gallop.   Pulmonary:      Effort: Pulmonary effort is normal. No respiratory distress.      Breath sounds: Normal breath sounds. No stridor. No wheezing, rhonchi or rales.   Skin:            Comments: 2.5 cm area of swelling and fluctuance to the superior left side of gluteal cleft. No surrounding erythema. It is tender to the touch.   Neurological:      General: No focal deficit present.      Mental Status: She is alert and oriented to person, place, and time.   Psychiatric:         Mood and Affect: Mood normal.         Behavior: Behavior normal.         ED Course & MDM   ED Course as of 01/22/25 0828   Wed Jan 22, 2025   0747 Pt is a 29 year old female presenting to the ED with concern for abscess. Vital signs stable, no tachycardia, afebrile. No signs of systemic infection. Abscess is fluctuant, will perform I&D. Pt to be given zofran, ibuprofen, and norco before procedure.  [VS]   0824 I&D performed and drained copious amounts of purulent fluid. Wound culture obtained. Previous wound culture revealed susceptibility to augmentin which pt was prescribed and tolerated well. Will send prescription for augmentin. Tramadol sent for pain control. Pt encouraged to continue warm compresses and sitz baths. Recommend general surgery follow up. Pt told to return to ED for  any new or worsening symptoms.  [VS]      ED Course User Index  [VS] Rich Prasad PA-C         Diagnoses as of 01/22/25 0828   Abscess                 No data recorded     Mason Coma Scale Score: 15 (01/22/25 0716 : Ad Acevedo, EMT)                     Medical Decision Making  Chronic Medical Conditions Significantly Affecting Care:      Escalation of Care: Appropriate for outpatient management    Counseling: Spoke with the patient and discussed today´s findings, in addition to providing specific details for the plan of care and expected course.  Patient was given the opportunity to ask questions.    Discussed return precautions and importance of follow-up.  Advised to follow-up with general surgery.  Advised to return to the ED for changing or worsening symptoms, new symptoms, complaint specific precautions, and precautions listed on the discharge paperwork.  Educated on the common potential side effects of medications prescribed.    I advised the patient that the emergency evaluation and treatment provided today doesn't end their need for medical care. It is very important that they follow-up with their primary care provider or other specialist as instructed.    The plan of care was mutually agreed upon with the patient. The patient and/or family were given the opportunity to ask questions. All questions asked today in the ED were answered to the best of my ability with today's information.    I specifically advised the patient to return to the ED for changing or worsening symptoms, worrisome new symptoms, or for any complaint specific precautions listed on the discharge paperwork.      Procedure  Incision and Drainage    Performed by: Rich Prasad PA-C  Authorized by: Rich Prasad PA-C    Consent:     Consent obtained:  Verbal    Consent given by:  Patient    Risks, benefits, and alternatives were discussed: yes      Risks discussed:  Bleeding, incomplete drainage, pain,  infection and damage to other organs    Alternatives discussed:  No treatment, alternative treatment, referral and observation  Universal protocol:     Procedure explained and questions answered to patient or proxy's satisfaction: yes      Relevant documents present and verified: yes      Test results available : yes      Site/side marked: yes      Immediately prior to procedure, a time out was called: yes      Patient identity confirmed:  Verbally with patient and arm band  Location:     Type:  Abscess    Size:  2.5    Location:  Anogenital    Anogenital location:  Gluteal cleft  Pre-procedure details:     Skin preparation:  Povidone-iodine  Sedation:     Sedation type:  None  Anesthesia:     Anesthesia method:  Local infiltration    Local anesthetic:  Lidocaine 1% WITH epi  Procedure type:     Complexity:  Complex  Procedure details:     Ultrasound guidance: no      Incision types:  Single straight    Incision depth:  Subcutaneous    Wound management:  Probed and deloculated and irrigated with saline    Drainage:  Bloody and purulent    Drainage amount:  Copious    Wound treatment:  Wound left open    Packing materials:  None  Post-procedure details:     Procedure completion:  Tolerated       Rich Prasad PA-C  01/22/25 0829

## 2025-01-25 ENCOUNTER — TELEPHONE (OUTPATIENT)
Dept: PHARMACY | Facility: HOSPITAL | Age: 30
End: 2025-01-25
Payer: MEDICAID

## 2025-01-25 NOTE — PROGRESS NOTES
EDPD Note: Antibiotics Reviewed and Warranted    Contacted Mr./Mrs./Ms. Leonardo Hardy regarding a positive wound culture/result that was taken during their recent emergency room visit. I completed education with patient . The patient is being treated appropriately with Augmentin.    Patient presented to ED for abscess drainage. She has hidradenitis suppurativa and was discharged on Augmentin 875 mg bid x 7 days which is appropriate and has been used in the past for her HS. Patient did not endorse any discoloration in drainage or any systemic sx. Counseled patient to complete course of abx and return to ED if sx worsen or develop new sx.     Susceptibility data from last 90 days.  Collected Specimen Info Organism Amoxicillin/Clavulanate Ampicillin Ampicillin/Sulbactam Cefazolin Ciprofloxacin Gentamicin Levofloxacin Piperacillin/Tazobactam Trimethoprim/Sulfamethoxazole   01/22/25 Tissue/Biopsy from Skin/Superficial Abscess Mixed Skin Microorganisms               Mixed Anaerobic Bacteria            12/22/24 Tissue/Biopsy from Skin/Superficial Abscess Klebsiella pneumoniae/variicola  S  R  S  S  S  S  S  S  S     Mixed Anaerobic Bacteria                 No further follow up needed from EDPD Team.     Shaunna Mora, PharmD

## 2025-01-30 ENCOUNTER — APPOINTMENT (OUTPATIENT)
Dept: PRIMARY CARE | Facility: CLINIC | Age: 30
End: 2025-01-30
Payer: MEDICAID

## 2025-02-16 ENCOUNTER — HOSPITAL ENCOUNTER (EMERGENCY)
Facility: HOSPITAL | Age: 30
Discharge: HOME | End: 2025-02-16
Payer: MEDICAID

## 2025-02-16 VITALS
RESPIRATION RATE: 16 BRPM | HEIGHT: 65 IN | TEMPERATURE: 98.4 F | OXYGEN SATURATION: 99 % | SYSTOLIC BLOOD PRESSURE: 130 MMHG | DIASTOLIC BLOOD PRESSURE: 77 MMHG | WEIGHT: 211 LBS | HEART RATE: 61 BPM | BODY MASS INDEX: 35.16 KG/M2

## 2025-02-16 DIAGNOSIS — L02.91 ABSCESS: Primary | ICD-10-CM

## 2025-02-16 PROCEDURE — 10060 I&D ABSCESS SIMPLE/SINGLE: CPT

## 2025-02-16 PROCEDURE — 87077 CULTURE AEROBIC IDENTIFY: CPT | Mod: AHULAB

## 2025-02-16 PROCEDURE — 2500000004 HC RX 250 GENERAL PHARMACY W/ HCPCS (ALT 636 FOR OP/ED)

## 2025-02-16 PROCEDURE — 2500000001 HC RX 250 WO HCPCS SELF ADMINISTERED DRUGS (ALT 637 FOR MEDICARE OP)

## 2025-02-16 PROCEDURE — 99283 EMERGENCY DEPT VISIT LOW MDM: CPT | Mod: 25

## 2025-02-16 PROCEDURE — 10061 I&D ABSCESS COMP/MULTIPLE: CPT

## 2025-02-16 RX ORDER — IBUPROFEN 400 MG/1
800 TABLET ORAL ONCE
Status: COMPLETED | OUTPATIENT
Start: 2025-02-16 | End: 2025-02-16

## 2025-02-16 RX ORDER — HYDROCODONE BITARTRATE AND ACETAMINOPHEN 5; 325 MG/1; MG/1
1 TABLET ORAL ONCE
Status: COMPLETED | OUTPATIENT
Start: 2025-02-16 | End: 2025-02-16

## 2025-02-16 RX ORDER — AMOXICILLIN AND CLAVULANATE POTASSIUM 875; 125 MG/1; MG/1
1 TABLET, FILM COATED ORAL EVERY 12 HOURS
Qty: 14 TABLET | Refills: 0 | Status: SHIPPED | OUTPATIENT
Start: 2025-02-16 | End: 2025-02-23

## 2025-02-16 RX ORDER — ONDANSETRON 4 MG/1
4 TABLET, ORALLY DISINTEGRATING ORAL ONCE
Status: COMPLETED | OUTPATIENT
Start: 2025-02-16 | End: 2025-02-16

## 2025-02-16 RX ORDER — AMOXICILLIN AND CLAVULANATE POTASSIUM 875; 125 MG/1; MG/1
1 TABLET, FILM COATED ORAL ONCE
Status: COMPLETED | OUTPATIENT
Start: 2025-02-16 | End: 2025-02-16

## 2025-02-16 RX ORDER — LIDOCAINE HYDROCHLORIDE AND EPINEPHRINE 10; 10 UG/ML; MG/ML
10 INJECTION, SOLUTION INFILTRATION; PERINEURAL ONCE
Status: COMPLETED | OUTPATIENT
Start: 2025-02-16 | End: 2025-02-16

## 2025-02-16 RX ORDER — TRAMADOL HYDROCHLORIDE 50 MG/1
50 TABLET ORAL EVERY 6 HOURS PRN
Qty: 12 TABLET | Refills: 0 | Status: SHIPPED | OUTPATIENT
Start: 2025-02-16 | End: 2025-02-19

## 2025-02-16 RX ADMIN — AMOXICILLIN AND CLAVULANATE POTASSIUM 1 TABLET: 875; 125 TABLET, COATED ORAL at 09:38

## 2025-02-16 RX ADMIN — LIDOCAINE HYDROCHLORIDE,EPINEPHRINE BITARTRATE 10 ML: 10; .01 INJECTION, SOLUTION INFILTRATION; PERINEURAL at 09:15

## 2025-02-16 RX ADMIN — ONDANSETRON 4 MG: 4 TABLET, ORALLY DISINTEGRATING ORAL at 08:35

## 2025-02-16 RX ADMIN — IBUPROFEN 800 MG: 400 TABLET, FILM COATED ORAL at 08:35

## 2025-02-16 RX ADMIN — HYDROCODONE BITARTRATE AND ACETAMINOPHEN 1 TABLET: 5; 325 TABLET ORAL at 08:35

## 2025-02-16 ASSESSMENT — PAIN DESCRIPTION - PAIN TYPE: TYPE: ACUTE PAIN

## 2025-02-16 ASSESSMENT — PAIN DESCRIPTION - PROGRESSION: CLINICAL_PROGRESSION: NOT CHANGED

## 2025-02-16 ASSESSMENT — PAIN DESCRIPTION - ORIENTATION: ORIENTATION: LEFT

## 2025-02-16 ASSESSMENT — PAIN SCALES - GENERAL
PAINLEVEL_OUTOF10: 10 - WORST POSSIBLE PAIN
PAINLEVEL_OUTOF10: 2
PAINLEVEL_OUTOF10: 10 - WORST POSSIBLE PAIN

## 2025-02-16 ASSESSMENT — PAIN DESCRIPTION - LOCATION: LOCATION: ARM

## 2025-02-16 ASSESSMENT — PAIN DESCRIPTION - DESCRIPTORS: DESCRIPTORS: PRESSURE

## 2025-02-16 ASSESSMENT — PAIN DESCRIPTION - FREQUENCY: FREQUENCY: CONSTANT/CONTINUOUS

## 2025-02-16 ASSESSMENT — PAIN - FUNCTIONAL ASSESSMENT
PAIN_FUNCTIONAL_ASSESSMENT: 0-10
PAIN_FUNCTIONAL_ASSESSMENT: 0-10

## 2025-02-16 ASSESSMENT — PAIN DESCRIPTION - ONSET: ONSET: GRADUAL

## 2025-02-16 NOTE — ED TRIAGE NOTES
Patient presents to ED from home for abscess under arm that has become painful. Patient states she noticed in 4 days ago. Patient admits to having HS. Darlenenes fever/chills.

## 2025-02-16 NOTE — ED PROVIDER NOTES
HPI   Chief Complaint   Patient presents with    Abscess       Patient is a 29-year-old female presenting to the ED with concern for an abscess in her left axillae.  Patient states that has been there for 4 days.  The area is painful, red, and fluctuant. Denies any warmth or discharge from the area. She tried taking ibuprofen at home with no relief.  She has been holding her arm in the shower under hot water for 3-4 minutes 3 times a day and patient states it appears this brought the area to a carrillo.  Denies any fever, chills, weakness, malaise, myalgias, sweats suggestive of systemic infection.  Denies diabetes, cancer, chemotherapy, steroid use, or HIV.  Patient has a past medical history of hidradenitis suppurativa and has seen general surgery. She had two abscess in her right axillae drained about two months ago and an abscess drained in her gluteal cleft a month ago. She does not have any problems with those abscesses and notes they have resolved completely. She was referred to an HS specialist for potential immunotherapy.         Patient History   Past Medical History:   Diagnosis Date    Asthma     Colon cancer (Multi)     Hidradenitis suppurativa     Recurrent genital herpes      Past Surgical History:   Procedure Laterality Date    CT ANGIO NECK  12/26/2020    CT ANGIO NECK 12/26/2020     Family History   Problem Relation Name Age of Onset    Diabetes Maternal Grandmother      Cancer Paternal Grandfather       Social History     Tobacco Use    Smoking status: Never    Smokeless tobacco: Never    Tobacco comments:     Canibus occasionally   Vaping Use    Vaping status: Never Used   Substance Use Topics    Alcohol use: Yes     Alcohol/week: 1.0 standard drink of alcohol     Types: 1 Glasses of wine per week     Comment: occassional    Drug use: Yes     Frequency: 1.0 times per week     Types: Marijuana     Comment: Last used 2 days ago       Physical Exam   ED Triage Vitals [02/16/25 0744]   Temperature  Heart Rate Respirations BP   36.9 °C (98.4 °F) 55 18 141/88      Pulse Ox Temp Source Heart Rate Source Patient Position   97 % Temporal Monitor Sitting      BP Location FiO2 (%)     Left arm --       Physical Exam  Constitutional:       General: She is not in acute distress.     Appearance: Normal appearance. She is not ill-appearing, toxic-appearing or diaphoretic.   HENT:      Head: Normocephalic and atraumatic.      Mouth/Throat:      Mouth: Mucous membranes are moist.      Pharynx: Oropharynx is clear. No oropharyngeal exudate or posterior oropharyngeal erythema.   Cardiovascular:      Rate and Rhythm: Normal rate and regular rhythm.      Heart sounds: Normal heart sounds. No murmur heard.     No friction rub. No gallop.   Pulmonary:      Effort: Pulmonary effort is normal. No respiratory distress.      Breath sounds: Normal breath sounds. No stridor. No wheezing, rhonchi or rales.   Musculoskeletal:      Cervical back: Normal range of motion and neck supple. No rigidity or tenderness.   Lymphadenopathy:      Cervical: No cervical adenopathy.   Skin:     Capillary Refill: Capillary refill takes less than 2 seconds.      Comments: 3 cm area of swelling and fluctuance to the superior left axillae. No surrounding erythema. It is tender to the touch.    Neurological:      General: No focal deficit present.      Mental Status: She is alert and oriented to person, place, and time.   Psychiatric:         Mood and Affect: Mood normal.         Behavior: Behavior normal.           ED Course & MDM   ED Course as of 02/16/25 0919   Sun Feb 16, 2025   0813 Patient is a 29-year-old female presenting to ED with concern for abscess in her left axilla.  The abscess is fluctuant and appropriate for I&D.  Will give patient Norco, ibuprofen, and Zofran prior to drainage. [VS]   0913 I&D performed and drained copious amounts of purulent fluid. Wound culture obtained. Previous wound culture revealed susceptibility to augmentin which  pt was prescribed and tolerated well. Will send prescription for augmentin. Tramadol sent for pain control. Pt encouraged to continue warm compresses. Recommend general surgery follow up. Pt told to return to ED for any new or worsening symptoms. [VS]      ED Course User Index  [VS] Rich Prasad PA-C         Diagnoses as of 02/16/25 0919   Abscess                 No data recorded     [place]ship Coma Scale Score: 15 (02/16/25 0749 : Cari Silva RN)                           Medical Decision Making  Chronic Medical Conditions Significantly Affecting Care:      Escalation of Care: Appropriate for outpatient management    Counseling: Spoke with the patient and discussed today´s findings, in addition to providing specific details for the plan of care and expected course.  Patient was given the opportunity to ask questions.    Discussed return precautions and importance of follow-up.  Advised to follow-up with general surgery.  Advised to return to the ED for changing or worsening symptoms, new symptoms, complaint specific precautions, and precautions listed on the discharge paperwork.  Educated on the common potential side effects of medications prescribed.    I advised the patient that the emergency evaluation and treatment provided today doesn't end their need for medical care. It is very important that they follow-up with their primary care provider or other specialist as instructed.    The plan of care was mutually agreed upon with the patient. The patient and/or family were given the opportunity to ask questions. All questions asked today in the ED were answered to the best of my ability with today's information.    I specifically advised the patient to return to the ED for changing or worsening symptoms, worrisome new symptoms, or for any complaint specific precautions listed on the discharge paperwork.    Procedure  Incision and Drainage    Performed by: Rich Prasad PA-C  Authorized by:  Rich Prasad PA-C    Consent:     Consent obtained:  Verbal    Consent given by:  Patient    Risks, benefits, and alternatives were discussed: yes      Risks discussed:  Bleeding, damage to other organs, incomplete drainage, infection and pain    Alternatives discussed:  No treatment and delayed treatment  Universal protocol:     Procedure explained and questions answered to patient or proxy's satisfaction: yes      Relevant documents present and verified: yes      Site/side marked: yes      Immediately prior to procedure, a time out was called: yes      Patient identity confirmed:  Verbally with patient and arm band  Location:     Type:  Abscess    Location:  Upper extremity    Upper extremity location:  Arm    Arm location:  L upper arm  Pre-procedure details:     Skin preparation:  Povidone-iodine  Sedation:     Sedation type:  None  Anesthesia:     Anesthesia method:  Local infiltration    Local anesthetic:  Lidocaine 1% WITH epi  Procedure type:     Complexity:  Complex  Procedure details:     Ultrasound guidance: no      Needle aspiration: no      Incision types:  Single straight    Incision depth:  Subcutaneous    Wound management:  Probed and deloculated and irrigated with saline    Drainage:  Bloody and purulent    Drainage amount:  Copious    Wound treatment:  Wound left open    Packing materials:  None  Post-procedure details:     Procedure completion:  Tolerated       Rich Prasad PA-C  02/16/25 0920

## 2025-02-16 NOTE — DISCHARGE INSTRUCTIONS
Please take Augmentin every 12 hours for 7 days Can take tramadol 50 mg every 6 hours as needed for severe pain. This is narcotic. Do not drive, drink alcohol or operate heavy machinery while taking this medication. Do not take other sedating medications when taking narcotics. Narcotics can cause constipation. Drink extra water and eat extra fiber to help prevent this. May take stool softners as needed.   Continue warm compresses at home   Follow-up with your general surgeon within the next couple of days Please return to ED for any new or worsening symptoms

## 2025-02-19 LAB
B-LACTAMASE ORGANISM ISLT: NEGATIVE
BACTERIA SPEC CULT: ABNORMAL
BACTERIA SPEC CULT: ABNORMAL
GRAM STN SPEC: ABNORMAL
GRAM STN SPEC: ABNORMAL

## 2025-02-20 ENCOUNTER — TELEPHONE (OUTPATIENT)
Dept: PHARMACY | Facility: HOSPITAL | Age: 30
End: 2025-02-20
Payer: MEDICAID

## 2025-02-20 NOTE — PROGRESS NOTES
EDPD Note: Antibiotics Reviewed and Warranted    Contacted Mr./Mrs./Ms. Leonardo Hardy regarding a positive tissue/wound culture/result that was taken during their recent emergency room visit. I completed education with patient . The patient is being treated appropriately with Augmentin 875/125 mg BID x 7D.     Patient presents to the ED for purulent abscess. I&D performed on 2/16/25. Patient reports that her wound is healing appropriately but is still draining. She has completed 5/7 days of therapy at this time. Recommend continue current course as prescribed due to patient improvement. Patient is in the process of scheduling a PCP visit for follow up.    Susceptibility data from last 90 days.  Collected Specimen Info Organism Amoxicillin/Clavulanate Ampicillin Ampicillin/Sulbactam Cefazolin Ciprofloxacin Gentamicin Levofloxacin Piperacillin/Tazobactam Trimethoprim/Sulfamethoxazole   02/16/25 Tissue/Biopsy from Wound/Tissue Klebsiella pneumoniae/variicola  S  R  S  S  S  S  S  S  S     Mixed Anaerobic Bacteria            01/22/25 Tissue/Biopsy from Skin/Superficial Abscess Mixed Skin Microorganisms               Mixed Anaerobic Bacteria            12/22/24 Tissue/Biopsy from Skin/Superficial Abscess Klebsiella pneumoniae/variicola  S  R  S  S  S  S  S  S  S     Mixed Anaerobic Bacteria                 No further follow up needed from EDPD Team.     Myla Preston, KimberlyD

## 2025-02-24 ENCOUNTER — APPOINTMENT (OUTPATIENT)
Dept: SURGERY | Facility: CLINIC | Age: 30
End: 2025-02-24
Payer: MEDICAID

## 2025-04-26 ENCOUNTER — HOSPITAL ENCOUNTER (EMERGENCY)
Facility: HOSPITAL | Age: 30
Discharge: HOME | End: 2025-04-26
Payer: MEDICAID

## 2025-04-26 VITALS
SYSTOLIC BLOOD PRESSURE: 125 MMHG | DIASTOLIC BLOOD PRESSURE: 68 MMHG | HEIGHT: 65 IN | BODY MASS INDEX: 34.99 KG/M2 | WEIGHT: 210 LBS | HEART RATE: 65 BPM | RESPIRATION RATE: 18 BRPM | TEMPERATURE: 97.3 F | OXYGEN SATURATION: 100 %

## 2025-04-26 DIAGNOSIS — L73.2 AXILLARY HIDRADENITIS SUPPURATIVA: Primary | ICD-10-CM

## 2025-04-26 PROCEDURE — 99283 EMERGENCY DEPT VISIT LOW MDM: CPT | Mod: 25

## 2025-04-26 PROCEDURE — 10061 I&D ABSCESS COMP/MULTIPLE: CPT

## 2025-04-26 PROCEDURE — 10060 I&D ABSCESS SIMPLE/SINGLE: CPT | Performed by: PHYSICIAN ASSISTANT

## 2025-04-26 RX ORDER — AMOXICILLIN AND CLAVULANATE POTASSIUM 875; 125 MG/1; MG/1
1 TABLET, FILM COATED ORAL EVERY 12 HOURS
Qty: 14 TABLET | Refills: 0 | Status: SHIPPED | OUTPATIENT
Start: 2025-04-26 | End: 2025-05-03

## 2025-04-26 RX ORDER — LIDOCAINE HYDROCHLORIDE 10 MG/ML
10 INJECTION, SOLUTION INFILTRATION; PERINEURAL ONCE
Status: DISCONTINUED | OUTPATIENT
Start: 2025-04-26 | End: 2025-04-26 | Stop reason: HOSPADM

## 2025-04-26 ASSESSMENT — PAIN DESCRIPTION - ORIENTATION: ORIENTATION: RIGHT;LEFT

## 2025-04-26 ASSESSMENT — PAIN DESCRIPTION - PAIN TYPE: TYPE: ACUTE PAIN

## 2025-04-26 ASSESSMENT — PAIN SCALES - GENERAL: PAINLEVEL_OUTOF10: 10 - WORST POSSIBLE PAIN

## 2025-04-26 ASSESSMENT — PAIN - FUNCTIONAL ASSESSMENT: PAIN_FUNCTIONAL_ASSESSMENT: 0-10

## 2025-04-26 NOTE — ED TRIAGE NOTES
"Patient presents to the ED by POV from home for \"boils\" in bilateral armpits. Patient states they have been there for 5 days. Reports pain prevented sleep tonight. Endorses using warm compresses and ibuprofen for comfort. Last ibuprofen was yesterday.   "

## 2025-04-26 NOTE — ED PROVIDER NOTES
HPI   Chief Complaint   Patient presents with    Wound Check       Is a 29-year-old female who complains of bilateral axillary abscess.  Symptoms have been there for the past 5 days pain is prevented her sleep.  She has been using warm compress and taking ibuprofen.  States that she needs antibiotics Augmentin is only 1 that helps her in the past.  She has seen a surgeon but they did not recommend removal of the lymph nodes.              Patient History   Medical History[1]  Surgical History[2]  Family History[3]  Social History[4]    Physical Exam   ED Triage Vitals [04/26/25 0647]   Temperature Heart Rate Respirations BP   36.3 °C (97.3 °F) 61 16 123/73      Pulse Ox Temp Source Heart Rate Source Patient Position   100 % Temporal -- Sitting      BP Location FiO2 (%)     Left arm --       Physical Exam  Vitals and nursing note reviewed.   Constitutional:       General: She is not in acute distress.     Appearance: Normal appearance. She is normal weight. She is not ill-appearing, toxic-appearing or diaphoretic.   HENT:      Head: Normocephalic and atraumatic.      Right Ear: External ear normal.      Left Ear: External ear normal.      Nose: Nose normal.      Mouth/Throat:      Mouth: Mucous membranes are moist.   Eyes:      Extraocular Movements: Extraocular movements intact.      Conjunctiva/sclera: Conjunctivae normal.      Pupils: Pupils are equal, round, and reactive to light.   Cardiovascular:      Rate and Rhythm: Normal rate and regular rhythm.   Pulmonary:      Effort: Pulmonary effort is normal. No respiratory distress.      Breath sounds: No stridor.   Abdominal:      General: There is no distension.   Musculoskeletal:         General: No swelling or deformity.      Cervical back: Normal range of motion.      Comments: Tender abscess of both axilla.  Multiple scars in the axilla from prior boils and I&D's   Skin:     General: Skin is warm.      Capillary Refill: Capillary refill takes less than 2  seconds.      Coloration: Skin is not jaundiced or pale.      Findings: No bruising, lesion or rash.   Neurological:      General: No focal deficit present.      Mental Status: She is alert and oriented to person, place, and time. Mental status is at baseline.   Psychiatric:         Mood and Affect: Mood normal.         Behavior: Behavior normal.         Thought Content: Thought content normal.         Judgment: Judgment normal.           ED Course & MDM   Diagnoses as of 04/26/25 1821   Axillary hidradenitis suppurativa                 No data recorded                                 Medical Decision Making  Differential diagnosis of abscess versus hidradenitis versus cellulitis    Will place on Augmentin and continue ibuprofen.    Referred to surgery    Risk  OTC drugs.  Prescription drug management.        Procedure  Incision and Drainage    Performed by: Adria Polanco PA-C  Authorized by: Adria Polanco PA-C    Consent:     Consent obtained:  Verbal    Consent given by:  Patient    Risks discussed:  Pain    Alternatives discussed:  No treatment  Universal protocol:     Procedure explained and questions answered to patient or proxy's satisfaction: yes      Immediately prior to procedure, a time out was called: yes      Patient identity confirmed:  Verbally with patient  Location:     Type:  Abscess    Location:  Upper extremity    Upper extremity location:  Arm    Arm location: boh axilla.  Pre-procedure details:     Skin preparation:  Povidone-iodine  Anesthesia:     Anesthesia method:  Local infiltration    Local anesthetic:  Lidocaine 1% w/o epi  Procedure type:     Complexity:  Simple  Procedure details:     Incision types:  Single straight (on each side)    Incision depth:  Subcutaneous    Wound management:  Probed, debrided and deloculated    Drainage:  Purulent    Drainage amount:  Moderate    Wound treatment:  Wound left open    Packing materials:  None  Post-procedure details:     Procedure completion:   Tolerated well, no immediate complications         [1]   Past Medical History:  Diagnosis Date    Asthma     Colon cancer (Multi)     Hidradenitis suppurativa     Recurrent genital herpes    [2]   Past Surgical History:  Procedure Laterality Date    CT ANGIO NECK  12/26/2020    CT ANGIO NECK 12/26/2020   [3]   Family History  Problem Relation Name Age of Onset    Diabetes Maternal Grandmother      Cancer Paternal Grandfather     [4]   Social History  Tobacco Use    Smoking status: Never    Smokeless tobacco: Never    Tobacco comments:     Canibus occasionally   Vaping Use    Vaping status: Never Used   Substance Use Topics    Alcohol use: Yes     Alcohol/week: 1.0 standard drink of alcohol     Types: 1 Glasses of wine per week     Comment: occassional    Drug use: Yes     Frequency: 1.0 times per week     Types: Marijuana        Adria Polanco PA-C  04/26/25 8400

## (undated) DEVICE — DRESSING, MEPILEX BORDER, POST-OP AG, 4 X 12 IN

## (undated) DEVICE — HIGH FLOW TIP FOR INTERPULSE HANDPIECE SET

## (undated) DEVICE — INTERPULSE HANDPIECE SET W/ 10FT SUCTION TUBING

## (undated) DEVICE — KIT, MINOR, DOUBLE BASIN

## (undated) DEVICE — BANDAGE, GAUZE, CONFORMING, KERLIX, 6 PLY, 4.5 IN X 4.1 YD

## (undated) DEVICE — Device

## (undated) DEVICE — GLOVE, SURGICAL, PROTEXIS PI ORTHO, 7.0, PF, LF

## (undated) DEVICE — DRAPE, SHEET, ENDOSCOPY, GENERAL, FENESTRATED, ARMBOARD COVER, 98 X 123.5 IN, DISPOSABLE, LF, STERILE

## (undated) DEVICE — SUTURE, PROLENE, 3-0, 18 IN, PS1, BLUE